# Patient Record
Sex: MALE | Race: WHITE | Employment: FULL TIME | URBAN - METROPOLITAN AREA
[De-identification: names, ages, dates, MRNs, and addresses within clinical notes are randomized per-mention and may not be internally consistent; named-entity substitution may affect disease eponyms.]

---

## 2018-08-27 ENCOUNTER — HOSPITAL ENCOUNTER (OUTPATIENT)
Dept: PHYSICAL THERAPY | Age: 43
Discharge: HOME | End: 2018-08-27
Payer: COMMERCIAL

## 2018-08-27 PROCEDURE — 97162 PT EVAL MOD COMPLEX 30 MIN: CPT
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
## 2018-08-27 NOTE — PROGRESS NOTES
Ambulatory/Rehab Services H2 Model Falls Risk Assessment    Risk Factor Pts. ·   Confusion/Disorientation/Impulsivity  []    4 ·   Symptomatic Depression  []   2 ·   Altered Elimination  []   1 ·   Dizziness/Vertigo  []   1 ·   Gender (Male)  [x]   1 ·   Any administered antiepileptics (anticonvulsants):  []   2 ·   Any administered benzodiazepines:  []   1 ·   Visual Impairment (specify):  []   1 ·   Portable Oxygen Use  []   1 ·   Orthostatic ? BP  []   1 ·   History of Recent Falls (within 3 mos.)  []   5     Ability to Rise from Chair (choose one) Pts. ·   Ability to rise in a single movement  []   0 ·   Pushes up, successful in one attempt  []   1 ·   Multiple attempts, but successful  []   3 ·   Unable to rise without assistance  []   4   Total: (5 or greater = High Risk) 1     Falls Prevention Plan:   []                Physical Limitations to Exercise (specify):   []                Mobility Assistance Device (type):   []                Exercise/Equipment Adaptation (specify):    ©2010 [facility] [address] Patent #[id number].  Federal Law prohibits the replication, distribution or use without written permission from [facility]

## 2018-09-06 ENCOUNTER — HOSPITAL ENCOUNTER (OUTPATIENT)
Dept: PHYSICAL THERAPY | Age: 43
Discharge: HOME | End: 2018-09-06
Payer: COMMERCIAL

## 2018-09-06 PROCEDURE — 97110 THERAPEUTIC EXERCISES: CPT

## 2018-09-06 PROCEDURE — 97140 MANUAL THERAPY 1/> REGIONS: CPT
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
## 2018-09-10 ENCOUNTER — HOSPITAL ENCOUNTER (OUTPATIENT)
Dept: PHYSICAL THERAPY | Age: 43
Discharge: HOME | End: 2018-09-10
Payer: COMMERCIAL

## 2018-09-10 PROCEDURE — 97140 MANUAL THERAPY 1/> REGIONS: CPT

## 2018-09-10 PROCEDURE — 97110 THERAPEUTIC EXERCISES: CPT
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
## 2018-09-17 ENCOUNTER — HOSPITAL ENCOUNTER (OUTPATIENT)
Dept: PHYSICAL THERAPY | Age: 43
Discharge: HOME | End: 2018-09-17
Payer: COMMERCIAL

## 2018-09-17 PROCEDURE — 97110 THERAPEUTIC EXERCISES: CPT

## 2018-09-17 PROCEDURE — 97140 MANUAL THERAPY 1/> REGIONS: CPT
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
## 2018-09-24 ENCOUNTER — HOSPITAL ENCOUNTER (OUTPATIENT)
Dept: PHYSICAL THERAPY | Age: 43
Discharge: HOME | End: 2018-09-24
Attending: UROLOGY
Payer: COMMERCIAL

## 2018-09-24 PROCEDURE — 97110 THERAPEUTIC EXERCISES: CPT

## 2018-09-24 PROCEDURE — 97140 MANUAL THERAPY 1/> REGIONS: CPT
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
## 2018-10-01 ENCOUNTER — HOSPITAL ENCOUNTER (OUTPATIENT)
Dept: PHYSICAL THERAPY | Age: 43
Discharge: HOME | End: 2018-10-01
Attending: UROLOGY
Payer: COMMERCIAL

## 2018-10-01 PROCEDURE — 97110 THERAPEUTIC EXERCISES: CPT

## 2018-10-01 PROCEDURE — 97140 MANUAL THERAPY 1/> REGIONS: CPT
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
## 2018-10-08 ENCOUNTER — HOSPITAL ENCOUNTER (OUTPATIENT)
Dept: PHYSICAL THERAPY | Age: 43
Discharge: HOME | End: 2018-10-08
Attending: UROLOGY
Payer: COMMERCIAL

## 2018-10-08 PROCEDURE — 97110 THERAPEUTIC EXERCISES: CPT

## 2018-10-08 PROCEDURE — 97140 MANUAL THERAPY 1/> REGIONS: CPT
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
## 2018-10-15 ENCOUNTER — HOSPITAL ENCOUNTER (OUTPATIENT)
Dept: PHYSICAL THERAPY | Age: 43
Discharge: HOME | End: 2018-10-15
Attending: UROLOGY
Payer: COMMERCIAL

## 2018-10-15 PROCEDURE — 97140 MANUAL THERAPY 1/> REGIONS: CPT

## 2018-10-15 PROCEDURE — 97110 THERAPEUTIC EXERCISES: CPT
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
## 2018-10-22 ENCOUNTER — HOSPITAL ENCOUNTER (OUTPATIENT)
Dept: PHYSICAL THERAPY | Age: 43
Discharge: HOME | End: 2018-10-22
Attending: UROLOGY
Payer: COMMERCIAL

## 2018-10-22 PROCEDURE — 97110 THERAPEUTIC EXERCISES: CPT

## 2018-10-22 PROCEDURE — 97140 MANUAL THERAPY 1/> REGIONS: CPT
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
## 2018-10-30 ENCOUNTER — HOSPITAL ENCOUNTER (OUTPATIENT)
Dept: PHYSICAL THERAPY | Age: 43
Discharge: HOME | End: 2018-10-30
Attending: UROLOGY
Payer: COMMERCIAL

## 2018-10-30 PROCEDURE — 97110 THERAPEUTIC EXERCISES: CPT

## 2018-10-30 PROCEDURE — 97140 MANUAL THERAPY 1/> REGIONS: CPT
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
## 2018-11-05 ENCOUNTER — HOSPITAL ENCOUNTER (OUTPATIENT)
Dept: PHYSICAL THERAPY | Age: 43
Discharge: HOME | End: 2018-11-05
Attending: UROLOGY
Payer: COMMERCIAL

## 2018-11-05 NOTE — PROGRESS NOTES
Reymundo Colon  : 1975  Primary: [facility]*  Secondary:  Therapy Center at [address] [place] [address]  [phone]   [phone]          OUTPATIENT DAILY NOTE    NAME/AGE/GENDER: Rosie Grossman is a 36 y.o. male. DATE: 2018    SUBJECTIVE:  Patient cancelled for appointment today due to travel out of town. Will plan to follow up on next scheduled visit.     Jen De La Torre PT,

## 2018-11-07 ENCOUNTER — APPOINTMENT (OUTPATIENT)
Dept: PHYSICAL THERAPY | Age: 43
End: 2018-11-07
Payer: COMMERCIAL

## 2018-11-12 ENCOUNTER — HOSPITAL ENCOUNTER (OUTPATIENT)
Dept: PHYSICAL THERAPY | Age: 43
Discharge: HOME | End: 2018-11-12
Attending: UROLOGY
Payer: COMMERCIAL

## 2018-11-12 PROCEDURE — 97110 THERAPEUTIC EXERCISES: CPT

## 2018-11-12 PROCEDURE — 97140 MANUAL THERAPY 1/> REGIONS: CPT
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
## 2018-11-19 ENCOUNTER — HOSPITAL ENCOUNTER (OUTPATIENT)
Dept: PHYSICAL THERAPY | Age: 43
Discharge: HOME | End: 2018-11-19
Attending: UROLOGY
Payer: COMMERCIAL

## 2018-11-19 PROBLEM — F41.8 ANXIETY ABOUT HEALTH: Status: ACTIVE | Noted: 2017-08-25

## 2018-11-19 PROBLEM — R10.2 PERINEAL PAIN IN MALE: Status: ACTIVE | Noted: 2017-09-08

## 2018-11-19 PROBLEM — N41.0 ACUTE PROSTATITIS: Status: ACTIVE | Noted: 2017-09-29

## 2018-11-19 PROCEDURE — 97110 THERAPEUTIC EXERCISES: CPT

## 2018-11-19 PROCEDURE — 97140 MANUAL THERAPY 1/> REGIONS: CPT
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
## 2018-11-28 ENCOUNTER — HOSPITAL ENCOUNTER (OUTPATIENT)
Dept: PHYSICAL THERAPY | Age: 43
Discharge: HOME | End: 2018-11-28
Payer: COMMERCIAL

## 2018-11-28 PROCEDURE — 97110 THERAPEUTIC EXERCISES: CPT

## 2018-11-28 PROCEDURE — 97140 MANUAL THERAPY 1/> REGIONS: CPT
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
## 2018-12-11 ENCOUNTER — HOSPITAL ENCOUNTER (OUTPATIENT)
Dept: PHYSICAL THERAPY | Age: 43
Discharge: HOME | End: 2018-12-11
Payer: COMMERCIAL

## 2018-12-11 PROCEDURE — 97110 THERAPEUTIC EXERCISES: CPT

## 2018-12-11 PROCEDURE — 97140 MANUAL THERAPY 1/> REGIONS: CPT
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
## 2018-12-18 ENCOUNTER — HOSPITAL ENCOUNTER (OUTPATIENT)
Dept: PHYSICAL THERAPY | Age: 43
Discharge: HOME | End: 2018-12-18
Payer: COMMERCIAL

## 2018-12-18 PROCEDURE — 97110 THERAPEUTIC EXERCISES: CPT

## 2018-12-18 PROCEDURE — 97140 MANUAL THERAPY 1/> REGIONS: CPT
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
## 2018-12-21 ENCOUNTER — APPOINTMENT (OUTPATIENT)
Dept: PHYSICAL THERAPY | Age: 43
End: 2018-12-21
Payer: COMMERCIAL

## 2018-12-26 ENCOUNTER — APPOINTMENT (OUTPATIENT)
Dept: PHYSICAL THERAPY | Age: 43
End: 2018-12-26
Payer: COMMERCIAL

## 2018-12-27 ENCOUNTER — HOSPITAL ENCOUNTER (OUTPATIENT)
Dept: PHYSICAL THERAPY | Age: 43
Discharge: HOME | End: 2018-12-27
Payer: COMMERCIAL

## 2018-12-27 PROCEDURE — 97110 THERAPEUTIC EXERCISES: CPT

## 2018-12-27 PROCEDURE — 97140 MANUAL THERAPY 1/> REGIONS: CPT
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
## 2019-01-09 ENCOUNTER — HOSPITAL ENCOUNTER (OUTPATIENT)
Dept: PHYSICAL THERAPY | Age: 44
Discharge: HOME | End: 2019-01-09
Payer: COMMERCIAL

## 2019-01-09 PROCEDURE — 97110 THERAPEUTIC EXERCISES: CPT
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
## 2019-01-16 ENCOUNTER — HOSPITAL ENCOUNTER (OUTPATIENT)
Dept: PHYSICAL THERAPY | Age: 44
Discharge: HOME | End: 2019-01-16
Payer: COMMERCIAL

## 2019-01-16 PROCEDURE — 97140 MANUAL THERAPY 1/> REGIONS: CPT

## 2019-01-16 PROCEDURE — 97110 THERAPEUTIC EXERCISES: CPT
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
## 2019-01-23 ENCOUNTER — HOSPITAL ENCOUNTER (OUTPATIENT)
Dept: PHYSICAL THERAPY | Age: 44
Discharge: HOME | End: 2019-01-23
Payer: COMMERCIAL

## 2019-01-23 PROCEDURE — 97110 THERAPEUTIC EXERCISES: CPT

## 2019-01-23 PROCEDURE — 97140 MANUAL THERAPY 1/> REGIONS: CPT
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
## 2019-01-30 ENCOUNTER — HOSPITAL ENCOUNTER (OUTPATIENT)
Dept: PHYSICAL THERAPY | Age: 44
Discharge: HOME | End: 2019-01-30
Payer: COMMERCIAL

## 2019-01-30 PROCEDURE — 97140 MANUAL THERAPY 1/> REGIONS: CPT

## 2019-01-30 PROCEDURE — 97110 THERAPEUTIC EXERCISES: CPT
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
## 2019-02-06 ENCOUNTER — HOSPITAL ENCOUNTER (OUTPATIENT)
Dept: PHYSICAL THERAPY | Age: 44
Discharge: HOME | End: 2019-02-06
Payer: SELF-PAY

## 2019-02-06 PROCEDURE — 97110 THERAPEUTIC EXERCISES: CPT

## 2019-02-06 PROCEDURE — 97140 MANUAL THERAPY 1/> REGIONS: CPT
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
## 2019-02-15 ENCOUNTER — HOSPITAL ENCOUNTER (OUTPATIENT)
Dept: PHYSICAL THERAPY | Age: 44
Discharge: HOME | End: 2019-02-15
Payer: SELF-PAY

## 2019-02-15 PROCEDURE — 97110 THERAPEUTIC EXERCISES: CPT

## 2019-02-15 PROCEDURE — 97140 MANUAL THERAPY 1/> REGIONS: CPT
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
## 2019-02-20 ENCOUNTER — HOSPITAL ENCOUNTER (OUTPATIENT)
Dept: PHYSICAL THERAPY | Age: 44
Discharge: HOME | End: 2019-02-20
Payer: SELF-PAY

## 2019-02-20 PROCEDURE — 97140 MANUAL THERAPY 1/> REGIONS: CPT

## 2019-02-20 PROCEDURE — 97110 THERAPEUTIC EXERCISES: CPT
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
## 2019-02-27 ENCOUNTER — HOSPITAL ENCOUNTER (OUTPATIENT)
Dept: PHYSICAL THERAPY | Age: 44
Discharge: HOME | End: 2019-02-27
Payer: SELF-PAY

## 2019-02-27 PROCEDURE — 97110 THERAPEUTIC EXERCISES: CPT

## 2019-02-27 PROCEDURE — 97140 MANUAL THERAPY 1/> REGIONS: CPT
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
## 2019-03-15 ENCOUNTER — HOSPITAL ENCOUNTER (OUTPATIENT)
Dept: PHYSICAL THERAPY | Age: 44
Discharge: HOME | End: 2019-03-15
Payer: SELF-PAY

## 2019-03-15 PROCEDURE — 97140 MANUAL THERAPY 1/> REGIONS: CPT

## 2019-03-15 PROCEDURE — 97110 THERAPEUTIC EXERCISES: CPT
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
## 2019-03-29 ENCOUNTER — HOSPITAL ENCOUNTER (OUTPATIENT)
Dept: PHYSICAL THERAPY | Age: 44
Discharge: HOME | End: 2019-03-29
Payer: SELF-PAY

## 2019-03-29 PROCEDURE — 97140 MANUAL THERAPY 1/> REGIONS: CPT

## 2019-03-29 PROCEDURE — 97110 THERAPEUTIC EXERCISES: CPT
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
## 2019-04-08 ENCOUNTER — HOSPITAL ENCOUNTER (OUTPATIENT)
Dept: PHYSICAL THERAPY | Age: 44
Discharge: HOME | End: 2019-04-08
Payer: SELF-PAY

## 2019-04-08 PROCEDURE — 97140 MANUAL THERAPY 1/> REGIONS: CPT

## 2019-04-08 PROCEDURE — 97110 THERAPEUTIC EXERCISES: CPT
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
## 2019-04-24 ENCOUNTER — HOSPITAL ENCOUNTER (OUTPATIENT)
Dept: PHYSICAL THERAPY | Age: 44
Discharge: HOME | End: 2019-04-24
Payer: SELF-PAY

## 2019-04-24 PROCEDURE — 97140 MANUAL THERAPY 1/> REGIONS: CPT

## 2019-04-24 PROCEDURE — 97110 THERAPEUTIC EXERCISES: CPT
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
## 2019-05-08 ENCOUNTER — HOSPITAL ENCOUNTER (OUTPATIENT)
Dept: PHYSICAL THERAPY | Age: 44
Discharge: HOME | End: 2019-05-08
Payer: SELF-PAY

## 2019-05-08 PROCEDURE — 97110 THERAPEUTIC EXERCISES: CPT

## 2019-05-08 PROCEDURE — 97140 MANUAL THERAPY 1/> REGIONS: CPT

## 2019-05-08 NOTE — PROGRESS NOTES
Gonzales Denney  : 1975  Payor: Delena Boeck / Plan: [place] OF [address] [place] [address] Rd / Product Type: PPO /  [id number]  at [facility]  [address] [place] [address]  [phone]   [phone]        OUTPATIENT PHYSICAL THERAPY: Daily Treatment Note 2019     Pre-treatment Symptoms/Complaints:  A little testicle pain at this time, but it continues to not even be on his mind for most days. Gold ball present. Mostly with sitting and following urination, but low grade. Trying to get in 3-4 walks and stretching per week. Pain: Initial: Pain Intensity 1: 1 /10 Post Session:  0/10   Medications Last Reviewed:  2019    Updated Objective Findings:  None Today   TREATMENT:     THERAPEUTIC EXERCISE: (35 minutes):  Exercises per grid below to improve mobility, strength and coordination. Required moderate verbal and tactile cues to promote proper performance of exercises. Progressed resistance, repetitions and complexity of movement as indicated. Date:  2019     Activity/Exercise Parameters   Hip Flexor Stretch B; 2 minutes   Adductor Stretch 2 minutes   Deep Squat Stretch 1 minute   Hip IR ROM Supine, Seated, 90/90 and prone; 5 minutes   Cat/Camel X 5 each direction   Thoracic Mobility On Foam Roll; 6 minutes   Happy Baby 3 x 30 seconds   Self PFM Release Crystal Wand; 15 minutes   Pelvic Floor Drops Paired w/ diaphragmatic breathing; 2 minutes   Patient Education    Home Exercise Program PFM Drops, Diaphragmatic Breathing, Deep Squat, Hip Flexor Stretch, Upward Dog, Cat/Camel; 1-2 x daily     MANUAL THERAPY: (18 minutes): Joint mobilization and Soft tissue mobilization was utilized and necessary because of the patient's restricted joint motion and restricted motion of soft tissue.   (Used abbreviations: MET - muscle energy technique; SCS- Strain counter strain; CTM- Connective tissue mobilizations; CR- Contract/relax; SP- Sustained pressure, TrP- Trigger point release, IASTM- Instrument assisted soft tissue mobilizations, TDN- Trigger point dry needling):  - SCS, CR, and SP throughout superficial PFM and pubococcygeus  - PA's throughout lumbosacral spine  - Passive R hip IR with sacral mobs    Treatment/Session Assessment:                  Response to Treatment:  Significant emphasis on first layer of PFM with reproduction of perineal pain at B perineum and ischiocavernosus. NO pain when leaving PT.    · Compliance with Program/Exercises: Compliant with basic drops, cardio and stretching at this time. · Recommendations/Intent for next treatment session: \"Next visit will focus on lumbar, thoracic and hip mobility\".     Treatment Plan of Care Effective Dates:  2/20/2019 to 5/15/2019  Total Treatment Billable Duration:  53 minutes  PT Patient Time In/Time Out  Time In: 2024  Time Out: [address] PT    Future Appointments   Date Time Provider Nico Singh   5/22/2019  1:00 PM [facility]   6/4/2019  1:00 PM Aiden Lugo PT [facility]

## 2019-05-22 ENCOUNTER — HOSPITAL ENCOUNTER (OUTPATIENT)
Dept: PHYSICAL THERAPY | Age: 44
End: 2019-05-22
Payer: SELF-PAY

## 2019-05-23 ENCOUNTER — HOSPITAL ENCOUNTER (OUTPATIENT)
Dept: PHYSICAL THERAPY | Age: 44
Discharge: HOME | End: 2019-05-23
Payer: SELF-PAY

## 2019-05-23 PROCEDURE — 97140 MANUAL THERAPY 1/> REGIONS: CPT

## 2019-05-23 PROCEDURE — 97110 THERAPEUTIC EXERCISES: CPT

## 2019-05-23 NOTE — PROGRESS NOTES
Bruno Rojo  : 1975  Payor: Simon Sifuentes / [place]: [place] [address] / Product Type: PPO /  [facility]  [address] [place] [address]  [phone]   [phone]        OUTPATIENT PHYSICAL THERAPY: Daily Treatment Note 2019     Pre-treatment Symptoms/Complaints:  See Re evaluation    Pain: Initial: Pain Intensity 1: 2  Pain Location 1: Pelvis /10 Post Session:  0/10   Medications Last Reviewed:  2019    Updated Objective Findings:  See evaluation note from today   TREATMENT:     THERAPEUTIC EXERCISE: (35 minutes):  Exercises per grid below to improve mobility, strength and coordination. Required moderate verbal and tactile cues to promote proper performance of exercises. Progressed resistance, repetitions and complexity of movement as indicated. Date:  2019     Activity/Exercise Parameters   Hip Flexor Stretch B; 2 minutes   Adductor Stretch 2 minutes   Deep Squat Stretch 1 minute   Hip IR ROM Supine, Seated, 90/90 and prone; 5 minutes   Cat/Camel X 5 each direction   Thoracic Mobility On Foam Roll; 6 minutes   Happy Baby 3 x 30 seconds   Self PFM Release Crystal Wand; 15 minutes   Pelvic Floor Drops Paired w/ diaphragmatic breathing; 2 minutes   Patient Education    Home Exercise Program PFM Drops, Diaphragmatic Breathing, Deep Squat, Hip Flexor Stretch, Upward Dog, Cat/Camel; 1-2 x daily     MANUAL THERAPY: (20 minutes): Joint mobilization and Soft tissue mobilization was utilized and necessary because of the patient's restricted joint motion and restricted motion of soft tissue.   (Used abbreviations: MET - muscle energy technique; SCS- Strain counter strain; CTM- Connective tissue mobilizations; CR- Contract/relax; SP- Sustained pressure, TrP- Trigger point release, IASTM- Instrument assisted soft tissue mobilizations, TDN- Trigger point dry needling):  - SCS, CR, and SP throughout superficial PFM and pubococcygeus  - PA's throughout lumbosacral spine  - Passive R hip IR with sacral mobs    Treatment/Session Assessment:                  Response to Treatment:  Pt continues to be a good candidate for PT. Will decrease frequency as symptoms improve. · Compliance with Program/Exercises: Compliant with basic drops, cardio and stretching at this time. · Recommendations/Intent for next treatment session: \"Next visit will focus on lumbar, thoracic and hip mobility\".     Treatment Plan of Care Effective Dates:  2/20/2019 to 5/15/2019  Total Treatment Billable Duration:  55 minutes  PT Patient Time In/Time Out  Time In: 0830  Time Out: 0930  Jaziel Barrow PT    Future Appointments   Date Time Provider Nico Singh   6/4/2019  1:00 PM Vonnie Larios PT [facility]
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
## 2019-05-31 ENCOUNTER — HOSPITAL ENCOUNTER (OUTPATIENT)
Dept: PHYSICAL THERAPY | Age: 44
End: 2019-05-31
Payer: SELF-PAY

## 2019-06-04 ENCOUNTER — HOSPITAL ENCOUNTER (OUTPATIENT)
Dept: PHYSICAL THERAPY | Age: 44
Discharge: HOME | End: 2019-06-04
Payer: SELF-PAY

## 2019-06-04 PROCEDURE — 97110 THERAPEUTIC EXERCISES: CPT

## 2019-06-04 PROCEDURE — 97140 MANUAL THERAPY 1/> REGIONS: CPT

## 2019-06-04 NOTE — PROGRESS NOTES
Gladys Mcclelland  : 1975  Payor: Yanick Sommer / [place] OF [address] [place] [address] Rd / Product Type: PPO /  [address]  at [facility][note 3]2 [address]  [phone]   [phone]        OUTPATIENT PHYSICAL THERAPY: Daily Treatment Note 2019     Pre-treatment Symptoms/Complaints:  Still in a good place, but relatively steady. Continued perineal pain in sitting. At worst 3/10. Testicular pain has come on ~ 2 times in the past week. Pain: Initial:   /10 Post Session:  010   Medications Last Reviewed:  2019    Updated Objective Findings:  None Today   TREATMENT:     THERAPEUTIC EXERCISE: (35 minutes):  Exercises per grid below to improve mobility, strength and coordination. Required moderate verbal and tactile cues to promote proper performance of exercises. Progressed resistance, repetitions and complexity of movement as indicated. Date:  2019     Activity/Exercise Parameters   Hip Flexor Stretch B; 2 minutes   Adductor Stretch 2 minutes   Deep Squat Stretch 1 minute   Hip IR ROM Supine, Seated, 90/90 and prone; 5 minutes   Cat/Camel X 5 each direction   Thoracic Mobility On Foam Roll; 6 minutes   Happy Baby 3 x 30 seconds   Self PFM Release Crystal Wand; 15 minutes   Pelvic Floor Drops Paired w/ diaphragmatic breathing; 2 minutes   Patient Education    Home Exercise Program PFM Drops, Diaphragmatic Breathing, Deep Squat, Hip Flexor Stretch, Upward Dog, Cat/Camel; 1-2 x daily     MANUAL THERAPY: (20 minutes): Joint mobilization and Soft tissue mobilization was utilized and necessary because of the patient's restricted joint motion and restricted motion of soft tissue.   (Used abbreviations: MET - muscle energy technique; SCS- Strain counter strain; CTM- Connective tissue mobilizations; CR- Contract/relax; SP- Sustained pressure, TrP- Trigger point release, IASTM- Instrument assisted soft tissue mobilizations, TDN- Trigger point dry needling):  - SCS, CR, and SP throughout superficial PFM and pubococcygeus  - PA's throughout lumbosacral spine  - Passive R hip IR with sacral mobs    Treatment/Session Assessment:                  Response to Treatment:  Emphasis on superficial urogenital triangle and R hip. Progressed hip IR ROM stretching this session as well to further improve pain in sitting. · Compliance with Program/Exercises: Compliant with basic drops, cardio and stretching at this time. · Recommendations/Intent for next treatment session: \"Next visit will focus on lumbar, thoracic and hip mobility\".     Treatment Plan of Care Effective Dates:  2/20/2019 to 5/15/2019  Total Treatment Billable Duration:  55 minutes     Brigitte Vasquez PT    Future Appointments   Date Time Provider Nico Singh   6/25/2019 12:00 PM Chrissy Montesinos PT [facility]

## 2019-06-25 ENCOUNTER — HOSPITAL ENCOUNTER (OUTPATIENT)
Dept: PHYSICAL THERAPY | Age: 44
Discharge: HOME | End: 2019-06-25
Payer: SELF-PAY

## 2019-06-25 PROCEDURE — 97140 MANUAL THERAPY 1/> REGIONS: CPT

## 2019-06-25 PROCEDURE — 97110 THERAPEUTIC EXERCISES: CPT

## 2019-06-25 NOTE — PROGRESS NOTES
Sarath Cabrera  : 1975  Payor: Kira Cline / [place] OF [address] [place] [address] Rd / Product Type: PPO /  [address]  at [facility]  [phone]   [phone]        OUTPATIENT PHYSICAL THERAPY: Daily Treatment Note 2019     Pre-treatment Symptoms/Complaints:  Still in a good place, but relatively steady. Continued perineal pain in sitting. At worst 3/10. However, there has been some daily testicular /hip pain that is nagging but then gradually subsides in the afternoon. Pain: Initial: Pain Intensity 1: 2 /10 Post Session:  0/10   Medications Last Reviewed:  2019    Updated Objective Findings:  None Today   TREATMENT:     THERAPEUTIC EXERCISE: (35 minutes):  Exercises per grid below to improve mobility, strength and coordination. Required moderate verbal and tactile cues to promote proper performance of exercises. Progressed resistance, repetitions and complexity of movement as indicated. Date:  2019     Activity/Exercise Parameters   Hip Flexor Stretch B; 2 minutes   Adductor Stretch 2 minutes   Deep Squat Stretch 1 minute   Hip IR ROM Supine, Seated, 90/90 and prone; 5 minutes   Cat/Camel X 5 each direction   Thoracic Mobility On Foam Roll; 6 minutes   Happy Baby 3 x 30 seconds   Self PFM Release Crystal Wand; 15 minutes   Pelvic Floor Drops Paired w/ diaphragmatic breathing; 2 minutes   Patient Education    Home Exercise Program PFM Drops, Diaphragmatic Breathing, Deep Squat, Hip Flexor Stretch, Upward Dog, Cat/Camel; 1-2 x daily     MANUAL THERAPY: (20 minutes): Joint mobilization and Soft tissue mobilization was utilized and necessary because of the patient's restricted joint motion and restricted motion of soft tissue.   (Used abbreviations: MET - muscle energy technique; SCS- Strain counter strain; CTM- Connective tissue mobilizations; CR- Contract/relax; SP- Sustained pressure, TrP- Trigger point release, IASTM- Instrument assisted soft tissue mobilizations, TDN- Trigger point dry needling):  - SCS, CR, and SP throughout superficial PFM and pubococcygeus  - PA's throughout lumbosacral spine  - Passive R hip IR with sacral mobs    Treatment/Session Assessment:                  Response to Treatment:  Tolerated treatment well. Progressing well    · Compliance with Program/Exercises: Compliant with basic drops, cardio and stretching at this time. · Recommendations/Intent for next treatment session: \"Next visit will focus on lumbar, thoracic and hip mobility\".     Treatment Plan of Care Effective Dates:  2/20/2019 to 5/15/2019  Total Treatment Billable Duration:  55 minutes  PT Patient Time In/Time Out  Time In: 1200  Time Out: [address], PT    Future Appointments   Date Time Provider Nico Singh   7/18/2019 12:00 PM Stalni Farah, PT [facility]

## 2019-07-18 ENCOUNTER — HOSPITAL ENCOUNTER (OUTPATIENT)
Dept: PHYSICAL THERAPY | Age: 44
Discharge: HOME | End: 2019-07-18
Payer: SELF-PAY

## 2019-07-18 PROCEDURE — 97110 THERAPEUTIC EXERCISES: CPT

## 2019-07-18 PROCEDURE — 97140 MANUAL THERAPY 1/> REGIONS: CPT

## 2019-07-18 NOTE — PROGRESS NOTES
Alida Pierre  : 1975  Payor: Kelley Yarbrough / [place] OF [address] [place] [address] Rd / Product Type: PPO /  [address]  at [facility]2 [address]  [phone]   [phone]        OUTPATIENT PHYSICAL THERAPY: Daily Treatment Note 2019     Pre-treatment Symptoms/Complaints:  Still in a good place, but relatively steady. Cyclical nature in pain. Mostly low grade hip/testicular pain in the day and then better in the evening. Pain: Initial: Pain Intensity 1: 1 /10 Post Session:  0/10   Medications Last Reviewed:  2019    Updated Objective Findings:  None Today   TREATMENT:     THERAPEUTIC EXERCISE: (35 minutes):  Exercises per grid below to improve mobility, strength and coordination. Required moderate verbal and tactile cues to promote proper performance of exercises. Progressed resistance, repetitions and complexity of movement as indicated. Date:  2019     Activity/Exercise Parameters   Hip Flexor Stretch B; 2 minutes   Adductor Stretch 2 minutes   Deep Squat Stretch 1 minute   Hip IR ROM Supine, Seated, 90/90 and prone; 5 minutes   Cat/Camel X 5 each direction   Thoracic Mobility On Foam Roll; 6 minutes   Happy Baby 3 x 30 seconds   Self PFM Release Crystal Wand; 15 minutes   Pelvic Floor Drops Paired w/ diaphragmatic breathing; 2 minutes   Patient Education    Home Exercise Program PFM Drops, Diaphragmatic Breathing, Deep Squat, Hip Flexor Stretch, Upward Dog, Cat/Camel; 1-2 x daily     MANUAL THERAPY: (20 minutes): Joint mobilization and Soft tissue mobilization was utilized and necessary because of the patient's restricted joint motion and restricted motion of soft tissue.   (Used abbreviations: MET - muscle energy technique; SCS- Strain counter strain; CTM- Connective tissue mobilizations; CR- Contract/relax; SP- Sustained pressure, TrP- Trigger point release, IASTM- Instrument assisted soft tissue mobilizations, TDN- Trigger point dry needling):  - SCS, CR, and SP throughout superficial PFM and pubococcygeus  - PA's throughout lumbosacral spine  - Passive R hip IR with sacral mobs  - CTM throughout R LQ and iliacus    Treatment/Session Assessment:                  Response to Treatment:  Continues to tolerate treatment well. Progressing well    · Compliance with Program/Exercises: Compliant with basic drops, cardio and stretching at this time. · Recommendations/Intent for next treatment session: \"Next visit will focus on lumbar, thoracic and hip mobility\".     Treatment Plan of Care Effective Dates:  2/20/2019 to 5/15/2019  Total Treatment Billable Duration:  55 minutes  PT Patient Time In/Time Out  Time In: 1200  Time Out: [address], PT    Future Appointments   Date Time Provider Nico Singh   8/5/2019  1:00 PM Sahrmin Robison, PT [facility]

## 2019-08-09 ENCOUNTER — HOSPITAL ENCOUNTER (OUTPATIENT)
Dept: PHYSICAL THERAPY | Age: 44
Discharge: HOME | End: 2019-08-09
Payer: SELF-PAY

## 2019-08-09 PROCEDURE — 97140 MANUAL THERAPY 1/> REGIONS: CPT

## 2019-08-09 PROCEDURE — 97110 THERAPEUTIC EXERCISES: CPT

## 2019-08-09 NOTE — PROGRESS NOTES
Alvaro Huddleston  : 1975  Payor: SELF PAY / Plan: BSI ELECT SELF PAY / Product Type: Self Pay /  [address]  at [facility]  [address] [place] [address]  [phone]   [phone]        OUTPATIENT PHYSICAL THERAPY: Daily Treatment Note 2019     Pre-treatment Symptoms/Complaints: Continue to be in a relatively good place. Pain no more than a 1-2/10 in the testicle, but is present on a daily basis. \"the pees\" still haven't returned. Pain: Initial: Pain Intensity 1: 1 /10 Post Session:  0/10   Medications Last Reviewed:  2019    Updated Objective Findings:  None Today   TREATMENT:     THERAPEUTIC EXERCISE: (35 minutes):  Exercises per grid below to improve mobility, strength and coordination. Required moderate verbal and tactile cues to promote proper performance of exercises. Progressed resistance, repetitions and complexity of movement as indicated. Date:  2019     Activity/Exercise Parameters   Hip Flexor Stretch B; 2 minutes   Adductor Stretch 2 minutes   Deep Squat Stretch 1 minute   Hip IR ROM Supine, Seated, 90/90 and prone; 5 minutes   Cat/Camel X 5 each direction   Thoracic Mobility    Happy Baby 3 x 30 seconds   Self PFM Release Crystal Wand; 15 minutes   Pelvic Floor Drops Paired w/ diaphragmatic breathing; 2 minutes   Patient Education Self Release w Loman Jaqueline ball; 6 minutes   Home Exercise Program PFM Drops, Diaphragmatic Breathing, Deep Squat, Hip Flexor Stretch, Upward Dog, Cat/Camel; 1-2 x daily     MANUAL THERAPY: (20 minutes): Joint mobilization and Soft tissue mobilization was utilized and necessary because of the patient's restricted joint motion and restricted motion of soft tissue.   (Used abbreviations: MET - muscle energy technique; SCS- Strain counter strain; CTM- Connective tissue mobilizations; CR- Contract/relax; SP- Sustained pressure, TrP- Trigger point release, IASTM- Instrument assisted soft tissue mobilizations, TDN- Trigger point dry needling):  - SCS, CR, and SP throughout superficial PFM and pubococcygeus  - PA's throughout lumbosacral spine  - Passive R hip IR with sacral mobs  - CTM throughout R LQ and iliacus    Treatment/Session Assessment:                  Response to Treatment:  Continues to tolerate treatment well. Progressing well    · Compliance with Program/Exercises: Compliant with basic drops, cardio and stretching at this time. · Recommendations/Intent for next treatment session: \"Next visit will focus on lumbar, thoracic and hip mobility\".     Treatment Plan of Care Effective Dates:  2/20/2019 to 5/15/2019  Total Treatment Billable Duration:  55 minutes  PT Patient Time In/Time Out  Time In: 0930  Time Out: [address], PT    Future Appointments   Date Time Provider Nico Singh   8/28/2019  9:30 AM Chay Hatch, PT [facility]

## 2019-08-28 ENCOUNTER — HOSPITAL ENCOUNTER (OUTPATIENT)
Dept: PHYSICAL THERAPY | Age: 44
Discharge: HOME | End: 2019-08-28
Payer: SELF-PAY

## 2019-08-28 PROCEDURE — 97110 THERAPEUTIC EXERCISES: CPT

## 2019-08-28 NOTE — PROGRESS NOTES
Antonio Cerda  : 1975  Payor: SELF PAY / Plan: BSI ELECT SELF PAY / Product Type: Self Pay /  [address]  at [facility]  [address] [place] [address]  [phone]   [phone]        OUTPATIENT PHYSICAL THERAPY: Daily Treatment Note 2019     Pre-treatment Symptoms/Complaints: Been a little more hip pain than usual. Perineum, fine and no urinary complaints. Pain: Initial: Pain Intensity 1: 2 /10 Post Session:  0/10   Medications Last Reviewed:  2019    Updated Objective Findings:  None Today   TREATMENT:     THERAPEUTIC EXERCISE: (45 minutes):  Exercises per grid below to improve mobility, strength and coordination. Required moderate verbal and tactile cues to promote proper performance of exercises. Progressed resistance, repetitions and complexity of movement as indicated. Date:  2019     Activity/Exercise Parameters   Hip Flexor Stretch B; 2 minutes   Adductor Stretch 2 minutes   Deep Squat Stretch 1 minute   Hip IR ROM Supine, Seated, 90/90 and prone; 5 minutes   Cat/Camel X 5 each direction   Hip Mobility 5 minutes B   Happy Baby 3 x 30 seconds   Self PFM Release Crystal Wand; 20 minutes   Pelvic Floor Drops Paired w/ diaphragmatic breathing; 2 minutes   Patient Education    Home Exercise Program PFM Drops, Diaphragmatic Breathing, Deep Squat, Hip Flexor Stretch, Upward Dog, Cat/Camel; 1-2 x daily     MANUAL THERAPY: (5 minutes): Joint mobilization and Soft tissue mobilization was utilized and necessary because of the patient's restricted joint motion and restricted motion of soft tissue.   (Used abbreviations: MET - muscle energy technique; SCS- Strain counter strain; CTM- Connective tissue mobilizations; CR- Contract/relax; SP- Sustained pressure, TrP- Trigger point release, IASTM- Instrument assisted soft tissue mobilizations, TDN- Trigger point dry needling):  - SCS, CR, and SP throughout superficial PFM and pubococcygeus  - PA's throughout lumbosacral spine- NOT PERFORMED  - Passive R hip IR with sacral mobs- NOT PERFORMED  - CTM throughout R LQ and iliacus- NOT PERFORMED    Treatment/Session Assessment:                  Response to Treatment:  Emphasis on hip mobility. Prone passive hip IR and extension reproducing groin pain. Will benefit form continued hip exercise and MT    · Compliance with Program/Exercises: Compliant with basic drops, cardio and stretching at this time. · Recommendations/Intent for next treatment session: \"Next visit will focus on hip mobility\".     Treatment Plan of Care Effective Dates:  2/20/2019 to 5/15/2019  Total Treatment Billable Duration:  50 minutes  PT Patient Time In/Time Out  Time In: 0930  Time Out: [address] [facility], PT    Future Appointments   Date Time Provider Nico Singh   9/17/2019 12:00 PM Krystle Cedeno, PT [facility]
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
## 2019-09-17 ENCOUNTER — HOSPITAL ENCOUNTER (OUTPATIENT)
Dept: PHYSICAL THERAPY | Age: 44
Discharge: HOME | End: 2019-09-17
Payer: SELF-PAY

## 2019-09-17 PROCEDURE — 97110 THERAPEUTIC EXERCISES: CPT

## 2019-09-17 NOTE — PROGRESS NOTES
Kimani Lisbeth  : 1975  Payor: SELF PAY / Plan: BSHSI ELECT SELF PAY / Product Type: Self Pay /  [address]  at [facility]  [address] [place] [address]  [phone]   [phone]        OUTPATIENT PHYSICAL THERAPY: Daily Treatment Note 2019     Pre-treatment Symptoms/Complaints: Strung together a few good days. No pees. Pain: Initial: Pain Intensity 1: 2 /10 Post Session:  0/10   Medications Last Reviewed:  2019    Updated Objective Findings:  None Today   TREATMENT:     THERAPEUTIC EXERCISE: (45 minutes):  Exercises per grid below to improve mobility, strength and coordination. Required moderate verbal and tactile cues to promote proper performance of exercises. Progressed resistance, repetitions and complexity of movement as indicated. Date:  2019     Activity/Exercise Parameters   Hip Flexor Stretch B; 2 minutes   Adductor Stretch 2 minutes   Deep Squat Stretch    Hip IR ROM    Cat/Camel X 5 each direction   Hip Mobility    Happy Baby 3 x 30 seconds   Self PFM Release Crystal Wand; 10 minutes   Pelvic Floor Drops Paired w/ diaphragmatic breathing; 2 minutes   Inguinal ligament mobilizations  Passive and Active; 20 minutes   Home Exercise Program PFM Drops, Diaphragmatic Breathing, Deep Squat, Hip Flexor Stretch, Upward Dog, Cat/Camel; 1-2 x daily     MANUAL THERAPY: (7 minutes): Joint mobilization and Soft tissue mobilization was utilized and necessary because of the patient's restricted joint motion and restricted motion of soft tissue.   (Used abbreviations: MET - muscle energy technique; SCS- Strain counter strain; CTM- Connective tissue mobilizations; CR- Contract/relax; SP- Sustained pressure, TrP- Trigger point release, IASTM- Instrument assisted soft tissue mobilizations, TDN- Trigger point dry needling):  - SCS, CR, and SP throughout superficial PFM and pubococcygeus  - Perineal Release with Opposite hand on tailbone  - PA's throughout lumbosacral spine- NOT PERFORMED                Response to Treatment:  Emphasis on inguinal ligament mobilizations and complete reproduction of groin pain. Will continue to perform this to see if this minimizes symptoms even more. · Compliance with Program/Exercises: Compliant with basic drops, cardio and stretching at this time. · Recommendations/Intent for next treatment session: \"Next visit will focus on hip mobility\".     Treatment Plan of Care Effective Dates:  2/20/2019 to 5/15/2019  Total Treatment Billable Duration:  52 minutes  PT Patient Time In/Time Out  Time In: 1205  Time Out: [address], PT    Future Appointments   Date Time Provider Nico Singh   10/7/2019 12:00 PM Joann Dill, PT [facility]

## 2019-10-07 ENCOUNTER — HOSPITAL ENCOUNTER (OUTPATIENT)
Dept: PHYSICAL THERAPY | Age: 44
Discharge: HOME | End: 2019-10-07
Payer: SELF-PAY

## 2019-10-07 PROCEDURE — 97140 MANUAL THERAPY 1/> REGIONS: CPT

## 2019-10-07 PROCEDURE — 97110 THERAPEUTIC EXERCISES: CPT

## 2019-10-07 NOTE — PROGRESS NOTES
Kika Arshad  : 1975  Payor: SELF PAY / Plan: BSI ELECT SELF PAY / Product Type: Self Pay /  [address]  at [facility]  [address] [place] [address]  [phone]   [phone]        OUTPATIENT PHYSICAL THERAPY: Daily Treatment Note 10/7/2019     Pre-treatment Symptoms/Complaints: Has actually been feeling really good. Lately. Very low grade pain if any in the testicle/groin, until today. Pain: Initial: Pain Intensity 1: 1 10 Post Session:  0/10   Medications Last Reviewed:  10/7/2019    Updated Objective Findings:  None Today   TREATMENT:     THERAPEUTIC EXERCISE: (40 minutes):  Exercises per grid below to improve mobility, strength and coordination. Required moderate verbal and tactile cues to promote proper performance of exercises. Progressed resistance, repetitions and complexity of movement as indicated. Date:  2019     Activity/Exercise Parameters   Hip Flexor Stretch B; 2 minutes   Adductor Stretch 2 minutes   Deep Squat Stretch    Hip IR ROM    Cat/Camel X 5 each direction   Hip Mobility    Happy Baby 3 x 30 seconds   Self PFM Release Crystal Wand; 10 minutes   Pelvic Floor Drops Paired w/ diaphragmatic breathing; 2 minutes   Inguinal ligament mobilizations  Passive and Active; 20 minutes   Home Exercise Program PFM Drops, Diaphragmatic Breathing, Deep Squat, Hip Flexor Stretch, Upward Dog, Cat/Camel; 1-2 x daily     MANUAL THERAPY: (10 minutes): Joint mobilization and Soft tissue mobilization was utilized and necessary because of the patient's restricted joint motion and restricted motion of soft tissue.   (Used abbreviations: MET - muscle energy technique; SCS- Strain counter strain; CTM- Connective tissue mobilizations; CR- Contract/relax; SP- Sustained pressure, TrP- Trigger point release, IASTM- Instrument assisted soft tissue mobilizations, TDN- Trigger point dry needling):  - SCS, CR, and SP throughout superficial PFM and pubococcygeus  - Perineal Release with Opposite hand on tailbone- NOT PERFORMED  - Inguinal canal mobilizations; all directions                Response to Treatment:  Emphasis on inguinal ligament mobilizations and complete reproduction of groin pain. Will continue to perform this to see if this minimizes symptoms even more. · Compliance with Program/Exercises: Compliant with basic drops, cardio and stretching at this time. · Recommendations/Intent for next treatment session: \"Next visit will focus on hip mobility\".     Treatment Plan of Care Effective Dates:  2/20/2019 to 5/15/2019  Total Treatment Billable Duration:  50 minutes  PT Patient Time In/Time Out  Time In: 1200  Time Out: Naun 1690, PT    Future Appointments   Date Time Provider Nico Singh   10/24/2019 11:00 AM Rupinder Hatch, PT [facility]

## 2019-10-24 ENCOUNTER — HOSPITAL ENCOUNTER (OUTPATIENT)
Dept: PHYSICAL THERAPY | Age: 44
Discharge: HOME | End: 2019-10-24
Payer: SELF-PAY

## 2019-10-24 PROCEDURE — 97110 THERAPEUTIC EXERCISES: CPT

## 2019-10-24 PROCEDURE — 97140 MANUAL THERAPY 1/> REGIONS: CPT

## 2019-10-24 NOTE — PROGRESS NOTES
Shon Jones  : 1975  Payor: SELF PAY / Plan: BSI ELECT SELF PAY / Product Type: Self Pay /  [address]  at [facility]  [address] [place] [address]  [phone]   [phone]        OUTPATIENT PHYSICAL THERAPY: Daily Treatment Note 10/24/2019     Pre-treatment Symptoms/Complaints: It has been a very rough week. Increased perineal pain. And feels like the pees are coming back. Pain: Initial: Pain Intensity 1: 3 /10 Post Session:  2/10   Medications Last Reviewed:  10/24/2019    Updated Objective Findings:  None Today   TREATMENT:     THERAPEUTIC EXERCISE: (40 minutes):  Exercises per grid below to improve mobility, strength and coordination. Required moderate verbal and tactile cues to promote proper performance of exercises. Progressed resistance, repetitions and complexity of movement as indicated. Date:  2019     Activity/Exercise Parameters   Hip Flexor Stretch B; 2 minutes   Adductor Stretch 2 minutes   Education Bladder Health   Hip IR ROM    Cat/Camel X 5 each direction   Hip Mobility    Happy Baby 3 x 30 seconds   Self PFM Release Crystal Wand; 10 minutes   Pelvic Floor Drops Paired w/ diaphragmatic breathing; 2 minutes   Inguinal ligament mobilizations  Passive and Active; 20 minutes   Home Exercise Program PFM Drops, Diaphragmatic Breathing, Deep Squat, Hip Flexor Stretch, Upward Dog, Cat/Camel; 1-2 x daily     MANUAL THERAPY: (10 minutes): Joint mobilization and Soft tissue mobilization was utilized and necessary because of the patient's restricted joint motion and restricted motion of soft tissue.   (Used abbreviations: MET - muscle energy technique; SCS- Strain counter strain; CTM- Connective tissue mobilizations; CR- Contract/relax; SP- Sustained pressure, TrP- Trigger point release, IASTM- Instrument assisted soft tissue mobilizations, TDN- Trigger point dry needling):  - SCS, CR, and SP throughout superficial PFM and pubococcygeus  - Perineal Release with Opposite hand on tailbone- NOT PERFORMED  - Inguinal canal mobilizations; all directions                Response to Treatment:  Emphasis on perineal pain. Improvements in mobility following PT. Discussed bladder health    · Compliance with Program/Exercises: Compliant with basic drops, cardio and stretching at this time. · Recommendations/Intent for next treatment session: \"Next visit will focus on hip mobility\".     Treatment Plan of Care Effective Dates:  2/20/2019 to 5/15/2019  Total Treatment Billable Duration:  50 minutes  PT Patient Time In/Time Out  Time In: 1100  Time Out: Olegario Reyes PT    Future Appointments   Date Time Provider Nico Singh   11/14/2019  9:00 AM Clare ACEVEDO PT [facility]   12/5/2019 12:00 PM Manoj Mckeon, SANDER [facility]

## 2019-10-25 ENCOUNTER — APPOINTMENT (OUTPATIENT)
Dept: PHYSICAL THERAPY | Age: 44
End: 2019-10-25
Payer: SELF-PAY

## 2019-11-14 ENCOUNTER — HOSPITAL ENCOUNTER (OUTPATIENT)
Dept: PHYSICAL THERAPY | Age: 44
Discharge: HOME | End: 2019-11-14
Payer: SELF-PAY

## 2019-11-14 PROCEDURE — 97110 THERAPEUTIC EXERCISES: CPT

## 2019-11-14 PROCEDURE — 97140 MANUAL THERAPY 1/> REGIONS: CPT

## 2019-11-14 NOTE — PROGRESS NOTES
Geroldine Romberg  : 1975  Payor: SELF PAY / Plan: BSHSI ELECT SELF PAY / Product Type: Self Pay /  [address]  at [facility]  [address] [place] [address]  [phone]   [phone]        OUTPATIENT PHYSICAL THERAPY: Daily Treatment Note 2019     Pre-treatment Symptoms/Complaints: Been a better two weeks. Working on bladder health. Pees come in the evening. But short lived. Then minimal testicular pain at this time. Pain: Initial: Pain Intensity 1: 2 /10 Post Session:  2/10   Medications Last Reviewed:  2019    Updated Objective Findings:  None Today   TREATMENT:     THERAPEUTIC EXERCISE: (40 minutes):  Exercises per grid below to improve mobility, strength and coordination. Required moderate verbal and tactile cues to promote proper performance of exercises. Progressed resistance, repetitions and complexity of movement as indicated. Date:  2019     Activity/Exercise Parameters   Hip Flexor Stretch B; 2 minutes   Adductor Stretch 2 minutes   Education Bladder Health   Hip IR ROM    Cat/Camel X 5 each direction   Hip Mobility    Happy Baby 3 x 30 seconds   Self PFM Release Crystal Wand; 10 minutes   Pelvic Floor Drops Paired w/ diaphragmatic breathing; 2 minutes   Inguinal ligament mobilizations  Passive and Active; 20 minutes   Home Exercise Program PFM Drops, Diaphragmatic Breathing, Deep Squat, Hip Flexor Stretch, Upward Dog, Cat/Camel; 1-2 x daily     MANUAL THERAPY: (10 minutes): Joint mobilization and Soft tissue mobilization was utilized and necessary because of the patient's restricted joint motion and restricted motion of soft tissue.   (Used abbreviations: MET - muscle energy technique; SCS- Strain counter strain; CTM- Connective tissue mobilizations; CR- Contract/relax; SP- Sustained pressure, TrP- Trigger point release, IASTM- Instrument assisted soft tissue mobilizations, TDN- Trigger point dry needling):  - SCS, CR, and SP throughout superficial PFM and pubococcygeus  - Perineal Release with Opposite hand on tailbone- NOT PERFORMED  - Inguinal canal mobilizations; all directions                Response to Treatment:  Tolerated treatment well. Will plan to DC in the next few sessions. · Compliance with Program/Exercises: Compliant with basic drops, cardio and stretching at this time. · Recommendations/Intent for next treatment session: \"Next visit will focus on hip mobility\". Total Treatment Billable Duration:  50 minutes  PT Patient Time In/Time Out  Time In: 0900  Time Out: 0950  Becka Colbert, PT    No future appointments.

## 2019-12-04 NOTE — THERAPY DISCHARGE
Johnathon Mueller : 1975 Payor: SELF PAY / Plan: BSI ELECT SELF PAY / Product Type: Self Pay /  [address]  at [facility] 
[address] [place] [address] [phone]   [phone] OUTPATIENT PHYSICAL THERAPY:Discontinuation Summary 10/24/2019 ICD-10: Treatment Diagnosis: pelvic pain (R10.20); lack of coordination (R27.8); generalized weakness (M62.81) Precautions/Allergies:  
Patient has no known allergies. Fall Risk Score: 1 (? 5 = High Risk) MD Orders: Evaluate and treat MEDICAL/REFERRING DIAGNOSIS: 
Pelvic and perineal pain [R10.2] DATE OF ONSET: 2017 REFERRING PHYSICIAN: Elfego Roche* RETURN PHYSICIAN APPOINTMENT: TBD Discontinuations: Johnathon Mueller has been seen in physical therapy for 38 visits. Treatment has been discontinued at this time due to no longer in need of PT. The below goals were met prior to discontinuation. Some goals were not met due to early DC. Thank you for this referral.    
 
Re certification :  Due to continued pain, though manageable with PT we would like to extend PT at this time. RE-CERTIFICATION/RE-EVALUATION 2019: Anton Jamil has been seen for 6 sessions since last re-evaluation. He continues to make slow and steady progress towards painfree ADL's and recreation. His testicular pain varies on a daily basis but generally has not been more than a 2/10 on most days, and perineal pain is present constantly in sitting, though low grade. He will benefit from continued PT to further improve his mobility and decrease pain. RE CERTIFICATION/RE-EVALUATION 2019: Mr. Lin Kennedy has been seen in Pelvic PT for a total of 23 visits (10 since re-evaluation on ) with very slow and steady improvements in his pelvic pain symptoms.  Since January, he has begun walking and stretching 4 days weekly, as well as with continued PT, and he is finally reporting prolonged bouts of time where he does not even notice testicular pain. Perineal pain and pressure is still present on a daily basis in sitting, but is generally < 2/10. His urinary frequency and incomplete emptying is also improved, but he is very fearful this will return so he continues to limit his fluid intake and minimize his voids during the day. He will benefit from continued PT with an emphasis on down training, continued manual therapy (MT), flexibility, mobility, and hip ROM to further improve his symptoms and return to his prior level of function (PLOF). RE CERTIFICATION/RE-EVALUATION 11/28/2018: Mr. Mary Tavares has been seen for 13 sessions with improvements in his tolerance for internal and external manual therapy, improved PFM ROM and voluntary relaxation, and is compliant with a basic PFM/stretching HEP. He demonstrates temporary gains in pain levels, but continues to report pelvic pain on a daily basis. However the severity of pain is improved on a daily basis. He is reporting no urinary complaints at the time, however, and the severity of his pain is less. We discussed adding rectal valium prior to therapy to continue to improve PFM ROM and will continue aggressive MT and mobility to decrease, even eliminate his pelvic pain. He is pleasant and motivated to improve. INITIAL ASSESSMENT:  Mr. Mary Tavares presents with an overactive, tight, and tender pelvic floor muscle (PFM) group that is reproducing the pain he feels in his genitals and perineum on a daily basis. He also demonstrates a lack of coordination in the PFM, further contributing to tightness and pain with sitting and other ADL's. This pain limits him from recreating and is interfering with his work. Lastly, he demonstrates connective tissue (CT) restrictions and muscle tightness throughout the exterior pelvic girdle which is compounding his pelvic pain.  I believe he will benefit form skilled PT with an emphasis on manual therapy, down training, gentle mobility with gradual gluteal strengthening, and general bowel, bladder, and sexual health to return to his prior level of function (PLOF). He is pleasnt and extremely motivated. PROBLEM LIST (Impacting functional limitations): 1. Decreased Strength 2. Decreased ADL/Functional Activities 3. Increased Pain 4. Decreased Activity Tolerance 5. Decreased Flexibility/Joint Mobility 6. Decreased [place] with Home Exercise Program INTERVENTIONS PLANNED: 
1. Electrical Stimulation 2. Heat 3. Home Exercise Program (HEP) 4. Manual Therapy 5. Therapeutic Exercise/Strengthening TREATMENT PLAN: 
Effective Dates: 8/26/2019 TO 11/20/2019. Frequency/Duration: Every two-three weeks GOALS: (Goals have been discussed and agreed upon with patient.) Short-Term Functional Goals: Time Frame: 4 weeks 1. Patient will verbalize rationale and purposes for exercises. (MET 10/15/2018) 2. Pt will demonstrate 10 quick flicks of the pelvic floor muscle group, without compensation, to implement urge suppression appropriately with urgency of urination and decrease the sensation of \"pees\" during the day. (MET 2/20/2019) Discharge Goals: Time Frame: 12 weeks 1. Pt with demonstrate normal voluntary relaxation of the pelvic floor muscle group to improve pelvic floor ROM and tolerate pain free sitting x 30 minutes. (UNKNOWN) 2. Pt will demonstrate B hip IR ROM WNL and painfree to improve mobility and decrease tightness in the PFM group, to tolerate standing without testicular pain x 60 minutes. (MET SUBJECTIVE PORTION, HOWEVER HIP ROM STILL LIMITED) 3. Pt will demonstrate appropriate co contraction of the Transversus Abdominus and Pelvic Floor muscle group, and maintain x 60 seconds to improve core stability and return to dynamic physical activity such as running and jumping.(UNKNOWN) Rehabilitation Potential For Stated Goals: Good

## 2019-12-05 ENCOUNTER — APPOINTMENT (OUTPATIENT)
Dept: PHYSICAL THERAPY | Age: 44
End: 2019-12-05

## 2022-03-18 PROBLEM — N41.0 ACUTE PROSTATITIS: Status: ACTIVE | Noted: 2017-09-29

## 2022-03-19 PROBLEM — R45.89 ANXIETY ABOUT HEALTH: Status: ACTIVE | Noted: 2017-08-25

## 2022-03-19 PROBLEM — R10.2 PERINEAL PAIN IN MALE: Status: ACTIVE | Noted: 2017-09-08

## 2024-09-26 ENCOUNTER — OFFICE VISIT (OUTPATIENT)
Age: 49
End: 2024-09-26

## 2024-09-26 VITALS
DIASTOLIC BLOOD PRESSURE: 96 MMHG | HEART RATE: 92 BPM | OXYGEN SATURATION: 99 % | RESPIRATION RATE: 16 BRPM | SYSTOLIC BLOOD PRESSURE: 124 MMHG

## 2024-09-26 DIAGNOSIS — M25.571 ACUTE RIGHT ANKLE PAIN: Primary | ICD-10-CM

## 2024-09-26 PROBLEM — N41.0 ACUTE PROSTATITIS: Status: ACTIVE | Noted: 2017-09-29

## 2024-09-26 PROBLEM — R10.2 PERINEAL PAIN IN MALE: Status: ACTIVE | Noted: 2017-09-08

## 2024-09-26 PROBLEM — S39.94XA PENILE TRAUMA: Status: ACTIVE | Noted: 2017-08-25

## 2024-09-26 PROBLEM — E78.2 MIXED HYPERLIPIDEMIA: Status: ACTIVE | Noted: 2021-07-29

## 2024-09-26 PROBLEM — R00.2 PALPITATIONS: Status: ACTIVE | Noted: 2021-07-29

## 2024-09-26 PROBLEM — I51.7 LEFT VENTRICULAR HYPERTROPHY: Status: ACTIVE | Noted: 2021-07-29

## 2024-09-26 ASSESSMENT — ENCOUNTER SYMPTOMS
SHORTNESS OF BREATH: 0
ABDOMINAL PAIN: 0
BACK PAIN: 0

## 2024-10-01 ENCOUNTER — OFFICE VISIT (OUTPATIENT)
Dept: ORTHOPEDIC SURGERY | Age: 49
End: 2024-10-01
Payer: COMMERCIAL

## 2024-10-01 DIAGNOSIS — S99.911A INJURY OF RIGHT ANKLE, INITIAL ENCOUNTER: Primary | ICD-10-CM

## 2024-10-01 PROCEDURE — 99204 OFFICE O/P NEW MOD 45 MIN: CPT | Performed by: ORTHOPAEDIC SURGERY

## 2024-10-01 PROCEDURE — L4361 PNEUMA/VAC WALK BOOT PRE OTS: HCPCS | Performed by: ORTHOPAEDIC SURGERY

## 2024-10-01 RX ORDER — ASPIRIN 81 MG/1
81 TABLET ORAL 2 TIMES DAILY
Qty: 42 TABLET | Refills: 0 | Status: SHIPPED | OUTPATIENT
Start: 2024-10-01 | End: 2024-10-22

## 2024-10-01 RX ORDER — ERGOCALCIFEROL 1.25 MG/1
50000 CAPSULE, LIQUID FILLED ORAL WEEKLY
Qty: 12 CAPSULE | Refills: 1 | Status: SHIPPED | OUTPATIENT
Start: 2024-10-01

## 2024-10-01 NOTE — PROGRESS NOTES
Name: Jeremías Grossman  YOB: 1975  Gender: male  MRN: [id number]    Summary:RightAnkle Fracture: lateral mal.   Close.  DVT ultrasound ordered.    CAM Walker boot for 2 to 3 weeks.  F/u 2 weeks w/ Ankle Xray-if stable increase activities and boot     CC: Ankle Pain    HPI: Jeremías Grossman is a 48 y.o. male who sustained a fall last Friday on 9/26/2024.  He was walking [place] [address], slipped and fell on the wet spot injured his ankle..  Today they have difficulty walking and bearing weight on this ankle due to pain. They describe pain with movement too.  He is describing calf pain and calf cramping    ROS/Meds/PSH/PMH/FH/SH: below is tobacco and diabetes.  A full history is at the bottom of the note.   Patient Denies fever/chills, headache, visual changes, chest pain, shortness of breath, and nausea/vomiting/diarrhea   Tobacco:  reports that he has never smoked. He has quit using smokeless tobacco.  Diabetes: None    Physical Examination:  Gen: AAOx3 NAD    Lower Extremity: edema and ecchymosis noted around the ankle. Edema limits pulse exam but foot is wwp.  Decreased ROM at ankle due to pain and swelling.  EHL/FHL/EDL/FDL intact.  +SILT to t/s/s/dpn/spn intact.  TTP at the Lateral malleolus.  No signs of open injury or wounds.   He is having some calf pain.  Some tenderness on calf squeeze  Neuro:  normal SILT to s/s/sp/dp/t.  Reflexes normal: 1+ patella reflex bilaterally, 1+ achilles reflex bilaterally, negative babinski bilaterally. no signs of hyper reflexia or absent reflex    Vascular: BLE: 2+ DP pulse, toes wwp w/ BCR<2s    Imaging:   Interpretation of imaging and   X-Ray RIGHT Ankle 3 vw (AP/Lateral/Oblique) for ankle pain   Findings: Distal fibula fracture.  Ankle mortise rafael lined up.  Stress view with no signs of excessive widening   Impression: Right distal fibular fracture with stable ankle    Signature: Stephane Burk MD       X-Ray RIGHT Foot 2 vw (AP/Oblique) for foot pain

## 2024-10-01 NOTE — PROGRESS NOTES
The patient was prescribed a walker boot for the patient's right foot. The patient wears a size 11.5 shoe and I fitted them with a L size boot. The patient was fitted and instructed on the use of prescribed walker boot by a Registered Orthopedic Technician. I explained how to fit themselves and that the plastic flexible piece should always be on the front of the boot and secured by the Velcro straps on top. The air bladder in the boot was adjusted according to proper fit and comfort. The patient walked a short distance and acknowledged satisfaction with current fit. I also explained that they need a heel lift or a higher heeled shoe for the uninvolved LE to help normalize gait and avoid excessive low back stress/strain due to leg length inequality created from walker boot.    Patient read and signed documenting they understand and agree to [facility]s current DME return policy.

## 2024-10-02 ENCOUNTER — TELEPHONE (OUTPATIENT)
Dept: ORTHOPEDIC SURGERY | Age: 49
End: 2024-10-02

## 2024-10-02 ENCOUNTER — HOSPITAL ENCOUNTER (EMERGENCY)
Age: 49
Discharge: HOME | End: 2024-10-02
Attending: EMERGENCY MEDICINE
Payer: COMMERCIAL

## 2024-10-02 ENCOUNTER — HOSPITAL ENCOUNTER (OUTPATIENT)
Dept: ULTRASOUND IMAGING | Age: 49
Discharge: HOME | End: 2024-10-05
Attending: ORTHOPAEDIC SURGERY
Payer: COMMERCIAL

## 2024-10-02 VITALS
HEIGHT: 73 IN | HEART RATE: 99 BPM | DIASTOLIC BLOOD PRESSURE: 100 MMHG | WEIGHT: 226 LBS | BODY MASS INDEX: 29.95 KG/M2 | OXYGEN SATURATION: 97 % | RESPIRATION RATE: 16 BRPM | SYSTOLIC BLOOD PRESSURE: 140 MMHG | TEMPERATURE: 97.8 F

## 2024-10-02 DIAGNOSIS — S99.911A INJURY OF RIGHT ANKLE, INITIAL ENCOUNTER: ICD-10-CM

## 2024-10-02 DIAGNOSIS — I82.441 ACUTE DEEP VEIN THROMBOSIS (DVT) OF RIGHT TIBIAL VEIN (HCC): Primary | ICD-10-CM

## 2024-10-02 PROCEDURE — 6370000000 HC RX 637 (ALT 250 FOR IP): Performed by: EMERGENCY MEDICINE

## 2024-10-02 PROCEDURE — 93971 EXTREMITY STUDY: CPT

## 2024-10-02 PROCEDURE — 99283 EMERGENCY DEPT VISIT LOW MDM: CPT

## 2024-10-02 RX ADMIN — RIVAROXABAN 15 MG: 15 TABLET, FILM COATED ORAL at 10:26

## 2024-10-02 ASSESSMENT — PAIN - FUNCTIONAL ASSESSMENT: PAIN_FUNCTIONAL_ASSESSMENT: NONE - DENIES PAIN

## 2024-10-02 ASSESSMENT — LIFESTYLE VARIABLES
HOW OFTEN DO YOU HAVE A DRINK CONTAINING ALCOHOL: NEVER
HOW MANY STANDARD DRINKS CONTAINING ALCOHOL DO YOU HAVE ON A TYPICAL DAY: PATIENT DOES NOT DRINK

## 2024-10-02 NOTE — TELEPHONE ENCOUNTER
Emily arguello/ [facility] Ultrasound to report the RT LE Pauline is POSITIVE FOR DVT in RT PTV sent patient to [facility] ER
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
## 2024-10-02 NOTE — TELEPHONE ENCOUNTER
Emily arguello/ [facility] Ultrasound to report the RT LE Pauline is POSITIVE FOR DVT in RT PTV sent patient to [facility] ER

## 2024-10-02 NOTE — ED TRIAGE NOTES
Patient broke his foot last Thursday. Went to the Ortho doc yesterday and then was sent here today for an ultrasound of his right leg. [employer] brought him here because he has a small clot in lower, lateral ankle area.

## 2024-10-14 RX ORDER — DICLOFENAC SODIUM 75 MG/1
TABLET, DELAYED RELEASE ORAL
COMMUNITY
Start: 2024-09-26

## 2024-10-15 ENCOUNTER — OFFICE VISIT (OUTPATIENT)
Dept: ORTHOPEDIC SURGERY | Age: 49
End: 2024-10-15
Payer: COMMERCIAL

## 2024-10-15 DIAGNOSIS — S82.891D CLOSED FRACTURE OF RIGHT ANKLE WITH ROUTINE HEALING, SUBSEQUENT ENCOUNTER: Primary | ICD-10-CM

## 2024-10-15 DIAGNOSIS — S99.911A INJURY OF RIGHT ANKLE, INITIAL ENCOUNTER: ICD-10-CM

## 2024-10-15 PROBLEM — S82.891A CLOSED FRACTURE OF RIGHT ANKLE: Status: ACTIVE | Noted: 2024-10-15

## 2024-10-15 PROCEDURE — 99213 OFFICE O/P EST LOW 20 MIN: CPT | Performed by: ORTHOPAEDIC SURGERY

## 2024-10-15 NOTE — PROGRESS NOTES
Name: Jeremías Grossman  YOB: 1975  Gender: male  MRN: [id number]    Summary:RightAnkle Fracture: lateral mal.     Increase weightbearing in boot as tolerated  On Xarelto for DVT    F/u 4 weeks w/ Ankle Xray-transition to ASO brace     CC: Ankle Pain    HPI: Jeremías Grossman is a 48 y.o. male who sustained a fall last Friday on 9/26/2024.  He was walking [place] [address], slipped and fell on the wet spot injured his ankle..  Today they have difficulty walking and bearing weight on this ankle due to pain. They describe pain with movement too.  He is describing calf pain and calf cramping    10/15/2024-patient states he is doing well.  He has been nonweightbearing in the boot.  His DVT ultrasound was positive for blood clot and he is now on anticoagulation.    ROS/Meds/PSH/PMH/FH/SH: below is tobacco and diabetes.  A full history is at the bottom of the note.   Patient Denies fever/chills, headache, visual changes, chest pain, shortness of breath, and nausea/vomiting/diarrhea   Tobacco:  reports that he has never smoked. He has quit using smokeless tobacco.  Diabetes: None    Physical Examination:  Gen: AAOx3 NAD    Lower Extremity: edema and ecchymosis noted around the ankle. Edema limits pulse exam but foot is wwp.  Decreased ROM at ankle due to pain and swelling.  EHL/FHL/EDL/FDL intact.  +SILT to t/s/s/dpn/spn intact.  TTP at the Lateral malleolus.  No signs of open injury or wounds.   He is having some calf pain.  Some tenderness on calf squeeze  Neuro:  normal SILT to s/s/sp/dp/t.  Reflexes normal: 1+ patella reflex bilaterally, 1+ achilles reflex bilaterally, negative babinski bilaterally. no signs of hyper reflexia or absent reflex    Vascular: BLE: 2+ DP pulse, toes wwp w/ BCR<2s    Imaging:   Interpretation of imaging and   X-Ray RIGHT Ankle 3 vw (AP/Lateral/Oblique) for ankle pain   Findings: Distal fibula fracture.  Ankle mortise rafael lined up.  Stress view with no signs of excessive widening

## 2024-10-25 ENCOUNTER — HOSPITAL ENCOUNTER (EMERGENCY)
Age: 49
Discharge: HOME | End: 2024-10-25
Attending: STUDENT IN AN ORGANIZED HEALTH CARE EDUCATION/TRAINING PROGRAM
Payer: COMMERCIAL

## 2024-10-25 ENCOUNTER — APPOINTMENT (OUTPATIENT)
Dept: GENERAL RADIOLOGY | Age: 49
End: 2024-10-25
Payer: COMMERCIAL

## 2024-10-25 ENCOUNTER — APPOINTMENT (OUTPATIENT)
Dept: CT IMAGING | Age: 49
End: 2024-10-25
Payer: COMMERCIAL

## 2024-10-25 VITALS
SYSTOLIC BLOOD PRESSURE: 132 MMHG | HEIGHT: 73 IN | OXYGEN SATURATION: 96 % | DIASTOLIC BLOOD PRESSURE: 81 MMHG | TEMPERATURE: 100.4 F | WEIGHT: 225 LBS | RESPIRATION RATE: 16 BRPM | BODY MASS INDEX: 29.82 KG/M2 | HEART RATE: 92 BPM

## 2024-10-25 DIAGNOSIS — R50.9 FEVER, UNSPECIFIED FEVER CAUSE: Primary | ICD-10-CM

## 2024-10-25 LAB
ALBUMIN SERPL-MCNC: 4 G/DL (ref 3.5–5)
ALBUMIN/GLOB SERPL: 1.1 (ref 1–1.9)
ALP SERPL-CCNC: 92 U/L (ref 40–129)
ALT SERPL-CCNC: 33 U/L (ref 8–55)
ANION GAP SERPL CALC-SCNC: 12 MMOL/L (ref 9–18)
APPEARANCE UR: CLEAR
AST SERPL-CCNC: 29 U/L (ref 15–37)
BASOPHILS # BLD: 0.1 K/UL (ref 0–0.2)
BASOPHILS NFR BLD: 1 % (ref 0–2)
BILIRUB SERPL-MCNC: 0.7 MG/DL (ref 0–1.2)
BILIRUB UR QL: NEGATIVE
BUN SERPL-MCNC: 15 MG/DL (ref 6–23)
CALCIUM SERPL-MCNC: 9.5 MG/DL (ref 8.8–10.2)
CHLORIDE SERPL-SCNC: 100 MMOL/L (ref 98–107)
CO2 SERPL-SCNC: 24 MMOL/L (ref 20–28)
COLOR UR: ABNORMAL
CREAT SERPL-MCNC: 0.98 MG/DL (ref 0.8–1.3)
DIFFERENTIAL METHOD BLD: ABNORMAL
EOSINOPHIL # BLD: 0 K/UL (ref 0–0.8)
EOSINOPHIL NFR BLD: 0 % (ref 0.5–7.8)
ERYTHROCYTE [DISTWIDTH] IN BLOOD BY AUTOMATED COUNT: 12.4 % (ref 11.9–14.6)
GLOBULIN SER CALC-MCNC: 3.5 G/DL (ref 2.3–3.5)
GLUCOSE SERPL-MCNC: 102 MG/DL (ref 70–99)
GLUCOSE UR STRIP.AUTO-MCNC: NEGATIVE MG/DL
HCT VFR BLD AUTO: 44.9 % (ref 41.1–50.3)
HGB BLD-MCNC: 15.9 G/DL (ref 13.6–17.2)
HGB UR QL STRIP: NEGATIVE
IMM GRANULOCYTES # BLD AUTO: 0 K/UL (ref 0–0.5)
IMM GRANULOCYTES NFR BLD AUTO: 0 % (ref 0–5)
KETONES UR QL STRIP.AUTO: 15 MG/DL
LACTATE SERPL-SCNC: 1 MMOL/L (ref 0.5–2)
LEUKOCYTE ESTERASE UR QL STRIP.AUTO: NEGATIVE
LYMPHOCYTES # BLD: 0.7 K/UL (ref 0.5–4.6)
LYMPHOCYTES NFR BLD: 6 % (ref 13–44)
MCH RBC QN AUTO: 31.5 PG (ref 26.1–32.9)
MCHC RBC AUTO-ENTMCNC: 35.4 G/DL (ref 31.4–35)
MCV RBC AUTO: 89.1 FL (ref 82–102)
MONOCYTES # BLD: 1 K/UL (ref 0.1–1.3)
MONOCYTES NFR BLD: 9 % (ref 4–12)
NEUTS SEG # BLD: 9.5 K/UL (ref 1.7–8.2)
NEUTS SEG NFR BLD: 84 % (ref 43–78)
NITRITE UR QL STRIP.AUTO: NEGATIVE
NRBC # BLD: 0 K/UL (ref 0–0.2)
PH UR STRIP: 5 (ref 5–9)
PLATELET # BLD AUTO: 191 K/UL (ref 150–450)
PMV BLD AUTO: 10.8 FL (ref 9.4–12.3)
POTASSIUM SERPL-SCNC: 3.8 MMOL/L (ref 3.5–5.1)
PROT SERPL-MCNC: 7.4 G/DL (ref 6.3–8.2)
PROT UR STRIP-MCNC: NEGATIVE MG/DL
RBC # BLD AUTO: 5.04 M/UL (ref 4.23–5.6)
SODIUM SERPL-SCNC: 136 MMOL/L (ref 136–145)
SP GR UR REFRACTOMETRY: 1.02 (ref 1–1.02)
UROBILINOGEN UR QL STRIP.AUTO: 0.2 EU/DL (ref 0.2–1)
WBC # BLD AUTO: 11.3 K/UL (ref 4.3–11.1)

## 2024-10-25 PROCEDURE — 6360000002 HC RX W HCPCS: Performed by: STUDENT IN AN ORGANIZED HEALTH CARE EDUCATION/TRAINING PROGRAM

## 2024-10-25 PROCEDURE — 93005 ELECTROCARDIOGRAM TRACING: CPT | Performed by: STUDENT IN AN ORGANIZED HEALTH CARE EDUCATION/TRAINING PROGRAM

## 2024-10-25 PROCEDURE — 85025 COMPLETE CBC W/AUTO DIFF WBC: CPT

## 2024-10-25 PROCEDURE — 80053 COMPREHEN METABOLIC PANEL: CPT

## 2024-10-25 PROCEDURE — 83605 ASSAY OF LACTIC ACID: CPT

## 2024-10-25 PROCEDURE — 71260 CT THORAX DX C+: CPT

## 2024-10-25 PROCEDURE — 81003 URINALYSIS AUTO W/O SCOPE: CPT

## 2024-10-25 PROCEDURE — 71046 X-RAY EXAM CHEST 2 VIEWS: CPT

## 2024-10-25 PROCEDURE — 6360000004 HC RX CONTRAST MEDICATION: Performed by: STUDENT IN AN ORGANIZED HEALTH CARE EDUCATION/TRAINING PROGRAM

## 2024-10-25 PROCEDURE — 99285 EMERGENCY DEPT VISIT HI MDM: CPT

## 2024-10-25 PROCEDURE — 0202U NFCT DS 22 TRGT SARS-COV-2: CPT

## 2024-10-25 PROCEDURE — 96374 THER/PROPH/DIAG INJ IV PUSH: CPT

## 2024-10-25 PROCEDURE — 87040 BLOOD CULTURE FOR BACTERIA: CPT

## 2024-10-25 RX ORDER — KETOROLAC TROMETHAMINE 15 MG/ML
15 INJECTION, SOLUTION INTRAMUSCULAR; INTRAVENOUS ONCE
Status: COMPLETED | OUTPATIENT
Start: 2024-10-25 | End: 2024-10-25

## 2024-10-25 RX ORDER — IOPAMIDOL 755 MG/ML
100 INJECTION, SOLUTION INTRAVASCULAR
Status: COMPLETED | OUTPATIENT
Start: 2024-10-25 | End: 2024-10-25

## 2024-10-25 RX ADMIN — KETOROLAC TROMETHAMINE 15 MG: 15 INJECTION, SOLUTION INTRAMUSCULAR; INTRAVENOUS at 21:36

## 2024-10-25 RX ADMIN — IOPAMIDOL 100 ML: 755 INJECTION, SOLUTION INTRAVENOUS at 21:41

## 2024-10-25 ASSESSMENT — LIFESTYLE VARIABLES: HOW OFTEN DO YOU HAVE A DRINK CONTAINING ALCOHOL: NEVER

## 2024-10-25 ASSESSMENT — PAIN SCALES - GENERAL: PAINLEVEL_OUTOF10: 0

## 2024-10-25 ASSESSMENT — PAIN - FUNCTIONAL ASSESSMENT: PAIN_FUNCTIONAL_ASSESSMENT: NONE - DENIES PAIN

## 2024-10-25 NOTE — ED TRIAGE NOTES
Pt c/o of fever/ recently tested for flu and covid neg/ pt has recent hx of blood clot in right leg

## 2024-10-26 LAB
B PERT DNA SPEC QL NAA+PROBE: NOT DETECTED
BORDETELLA PARAPERTUSSIS BY PCR: NOT DETECTED
C PNEUM DNA SPEC QL NAA+PROBE: NOT DETECTED
EKG ATRIAL RATE: 118 BPM
EKG DIAGNOSIS: NORMAL
EKG P AXIS: 68 DEGREES
EKG P-R INTERVAL: 165 MS
EKG Q-T INTERVAL: 302 MS
EKG QRS DURATION: 92 MS
EKG QTC CALCULATION (BAZETT): 425 MS
EKG R AXIS: 64 DEGREES
EKG T AXIS: -16 DEGREES
EKG VENTRICULAR RATE: 119 BPM
FLUAV SUBTYP SPEC NAA+PROBE: NOT DETECTED
FLUBV RNA SPEC QL NAA+PROBE: NOT DETECTED
HADV DNA SPEC QL NAA+PROBE: NOT DETECTED
HCOV 229E RNA SPEC QL NAA+PROBE: NOT DETECTED
HCOV HKU1 RNA SPEC QL NAA+PROBE: NOT DETECTED
HCOV NL63 RNA SPEC QL NAA+PROBE: NOT DETECTED
HCOV OC43 RNA SPEC QL NAA+PROBE: NOT DETECTED
HMPV RNA SPEC QL NAA+PROBE: NOT DETECTED
HPIV1 RNA SPEC QL NAA+PROBE: NOT DETECTED
HPIV2 RNA SPEC QL NAA+PROBE: NOT DETECTED
HPIV3 RNA SPEC QL NAA+PROBE: NOT DETECTED
HPIV4 RNA SPEC QL NAA+PROBE: NOT DETECTED
M PNEUMO DNA SPEC QL NAA+PROBE: NOT DETECTED
RSV RNA SPEC QL NAA+PROBE: NOT DETECTED
RV+EV RNA SPEC QL NAA+PROBE: NOT DETECTED
SARS-COV-2 RNA RESP QL NAA+PROBE: DETECTED

## 2024-10-26 PROCEDURE — 93010 ELECTROCARDIOGRAM REPORT: CPT | Performed by: INTERNAL MEDICINE

## 2024-10-26 NOTE — DISCHARGE INSTRUCTIONS
Take Tylenol as needed for fever greater than 100.5.  Follow-up with primary care physician as needed.  Return to the ER for worsening or worrisome symptoms.    We would love to help you get a primary care doctor for follow-up after your emergency department visit.    Please call [phone] between 7AM - 6PM Monday to Friday.  A care navigator will be able to assist you with setting up a doctor close to your home.

## 2024-10-26 NOTE — ED PROVIDER NOTES
Emergency Department Provider Note       PCP: No, Pcp   Age: 48 y.o.   Sex: male     DISPOSITION Decision To Discharge 10/25/2024 10:51:35 PM    ICD-10-CM    1. Fever, unspecified fever cause  R50.9           Medical Decision Making     48-year-old male presents to the ED with wife at bedside.  Patient has had low-grade fever off and on for the past 4 days.  Reports mild achiness to his neck and upper extremities but no other symptoms.  Was seen outpatient urgent care earlier today, had a negative COVID and flu swab.  Patient is currently on Xarelto as he developed a superficial venous thrombosis after having a fracture of his distal right fibula.  Patient denies cough, congestion, sore throat, earache, chest pain, back pain, shortness of breath, swelling in lower extremities, dysuria, vomiting, diarrhea.  Patient is tachycardic initially on arrival, he is quite anxious.  Low-grade fever as well.  Will give IV Toradol as he has had Tylenol approximately 3 hours prior to arrival.  A broad-based workup was ordered including blood cultures.  Urinalysis shows ketones without any evidence of infection.  Patient given 2 large cups of water for oral hydration as he endorses not staying orally hydrated.  Lactic acid normal.  CBC is normal with stable H&H, normal CMP.  Chest x-ray is normal.  Through shared decision-making, will obtain a viral swab.  Heart rate improving to the 90s after discussing patient's lab work and advising him that his blood work do not reveal any evidence of sepsis.  Will obtain viral swab as well.  Patient is well-appearing and nontoxic.  He has no hypoxia nor chest pain or back pain.  Do not believe the patient has underlying PE.  He is compliant with his Xarelto as well.  Fever just began this week and he has been on Xarelto for the last few weeks.  CT scan shows no evidence of PE nor pneumonia.  Again patient likely with underlying viral illness with normal blood work.  Will discharge home  adjustment of the  mA and/or kV according to patient size, iterative reconstruction.    FINDINGS:  Suboptimal opacification of the distal pulmonary arteries. However, no  thromboembolic disease is identified. Normal thoracic aorta. Normal heart size  without pericardial effusion. Severe coronary artery calcified plaque.  Juxtapleural nodule in the right midlung, benign. Small focal peripheral opacity  in the lateral right lower lobe, favor atelectasis or scarring related to a  remote adjacent rib fracture. Tiny 3 mm subpleural nodule in the left lower  lobe.    Imaging through the upper abdomen shows cholecystectomy without acute  abnormality. Tiny cyst in each renal upper pole. Remote right rib fractures. No  acute rib fracture. No suspicious bone lesion.          Impression    1.  No pulmonary embolus.  2.  No acute findings within the chest.        Electronically signed by Emil Keys   Lactate, Sepsis   Result Value Ref Range    Lactic Acid, Sepsis 1.0 0.5 - 2.0 MMOL/L   CBC with Auto Differential   Result Value Ref Range    WBC 11.3 (H) 4.3 - 11.1 K/uL    RBC 5.04 4.23 - 5.6 M/uL    Hemoglobin 15.9 13.6 - 17.2 g/dL    Hematocrit 44.9 41.1 - 50.3 %    MCV 89.1 82.0 - 102.0 FL    MCH 31.5 26.1 - 32.9 PG    MCHC 35.4 (H) 31.4 - 35.0 g/dL    RDW 12.4 11.9 - 14.6 %    Platelets 191 150 - 450 K/uL    MPV 10.8 9.4 - 12.3 FL    nRBC 0.00 0.0 - 0.2 K/uL    Differential Type AUTOMATED      Neutrophils % 84 (H) 43 - 78 %    Lymphocytes % 6 (L) 13 - 44 %    Monocytes % 9 4.0 - 12.0 %    Eosinophils % 0 (L) 0.5 - 7.8 %    Basophils % 1 0.0 - 2.0 %    Immature Granulocytes % 0 0.0 - 5.0 %    Neutrophils Absolute 9.5 (H) 1.7 - 8.2 K/UL    Lymphocytes Absolute 0.7 0.5 - 4.6 K/UL    Monocytes Absolute 1.0 0.1 - 1.3 K/UL    Eosinophils Absolute 0.0 0.0 - 0.8 K/UL    Basophils Absolute 0.1 0.0 - 0.2 K/UL    Immature Granulocytes Absolute 0.0 0.0 - 0.5 K/UL   Comprehensive Metabolic Panel   Result Value Ref Range    Sodium 136 136 -

## 2024-10-27 LAB
BACTERIA SPEC CULT: NORMAL
BACTERIA SPEC CULT: NORMAL
SERVICE CMNT-IMP: NORMAL
SERVICE CMNT-IMP: NORMAL

## 2024-11-12 ENCOUNTER — OFFICE VISIT (OUTPATIENT)
Dept: ORTHOPEDIC SURGERY | Age: 49
End: 2024-11-12
Payer: COMMERCIAL

## 2024-11-12 DIAGNOSIS — S99.911D INJURY OF RIGHT ANKLE, SUBSEQUENT ENCOUNTER: ICD-10-CM

## 2024-11-12 DIAGNOSIS — S82.891D CLOSED FRACTURE OF RIGHT ANKLE WITH ROUTINE HEALING, SUBSEQUENT ENCOUNTER: Primary | ICD-10-CM

## 2024-11-12 PROCEDURE — 99213 OFFICE O/P EST LOW 20 MIN: CPT | Performed by: PHYSICIAN ASSISTANT

## 2024-11-12 NOTE — PROGRESS NOTES
Name: Jeremías Grossman  YOB: 1975  Gender: male  MRN: [id number]    Summary:RightAnkle Fracture: lateral mal.     Transition to ASO brace.  ASO brace given today.  This medically necessary for increased stability and functionality  Begin formal physical therapy    F/u 6 weeks w/ Ankle Xray-likely full release at this time     CC: Ankle Pain    HPI: Jeremías Grossman is a 48 y.o. male who sustained a fall last Friday on 9/26/2024.  He was walking downtown [address], slipped and fell on the wet spot injured his ankle..  Today they have difficulty walking and bearing weight on this ankle due to pain. They describe pain with movement too.  He is describing calf pain and calf cramping    10/15/2024-patient states he is doing well.  He has been nonweightbearing in the boot.  His DVT ultrasound was positive for blood clot and he is now on anticoagulation.    11/12/2024-patient states he is doing well.  He is ambulating in the boot without difficulty.  He states he is really ready to come out of the boot at this time.  He still has some tenderness along the lateral malleolus but feels that he is significantly improved.    ROS/Meds/PSH/PMH/FH/SH: below is tobacco and diabetes.  A full history is at the bottom of the note.   Patient Denies fever/chills, headache, visual changes, chest pain, shortness of breath, and nausea/vomiting/diarrhea   Tobacco:  reports that he has never smoked. He has quit using smokeless tobacco.  Diabetes: None    Physical Examination:  Gen: AAOx3 NAD    Lower Extremity: edema noted around the ankle.   Decreased ROM at ankle due to pain and swelling.  EHL/FHL/EDL/FDL intact.  +SILT to t/s/s/dpn/spn intact.  TTP at the Lateral malleolus.  No signs of open injury or wounds.   He is having some calf pain.  Some tenderness on calf squeeze  Neuro:  normal SILT to s/s/sp/dp/t.  Reflexes normal: 1+ patella reflex bilaterally, 1+ achilles reflex bilaterally, negative babinski bilaterally. no signs of

## 2024-11-13 NOTE — PROGRESS NOTES
The patient was prescribed a Wraptor brace for the patient's rightfoot. The patient wears a size 11.5 shoe and I fitted the patient with a L brace. I explained how to fit the brace properly by pulling the lace tabs across top of foot first then under arch and lastly pulling the strap up firmly and attaching to the lateral Velcro strip. Thus forming a figure 8 across the ankle joint. Once the figure 8 is completed they are to secure the top (short circumferential) straps to help avoid the straps from loosening with normal wear.  The patient was able to demonstrate proper fitting in office to ensure compliance with device and acknowledged satisfaction with current fit.     Patient read and signed documenting they understand and agree to [facility]'s current DME return policy.

## 2024-11-15 ENCOUNTER — HOSPITAL ENCOUNTER (OUTPATIENT)
Dept: PHYSICAL THERAPY | Age: 49
Setting detail: RECURRING SERIES
Discharge: HOME | End: 2024-11-18
Payer: COMMERCIAL

## 2024-11-15 DIAGNOSIS — M25.571 PAIN IN RIGHT ANKLE AND JOINTS OF RIGHT FOOT: Primary | ICD-10-CM

## 2024-11-15 DIAGNOSIS — R26.2 DIFFICULTY IN WALKING, NOT ELSEWHERE CLASSIFIED: ICD-10-CM

## 2024-11-15 DIAGNOSIS — M25.671 STIFFNESS OF RIGHT ANKLE, NOT ELSEWHERE CLASSIFIED: ICD-10-CM

## 2024-11-15 PROCEDURE — 97530 THERAPEUTIC ACTIVITIES: CPT

## 2024-11-15 PROCEDURE — 97161 PT EVAL LOW COMPLEX 20 MIN: CPT

## 2024-11-15 ASSESSMENT — PAIN SCALES - GENERAL: PAINLEVEL_OUTOF10: 0

## 2024-11-15 NOTE — THERAPY EVALUATION
Sex:  male        Age:  48 y.o.     OBJECTIVE   Assessment on 11/15/2024  Observation/Orthostatic Postural Assessment:    The following postural deficits were noted in sittin/15/2024  loss of cervical lordosis, increased thoracic kyphosis, forward head, rounded shoulders, and posterior pelvic tilt      The following postural deficits were noted in standing  11/15/2024  forward trunk flexion and excessive weightbearing on L LE, R LE externally rotated     Palpation:          Pt with increased edema and tenderness reported at R lower leg and ankle, mostly laterally  ROM:    Most recent:   Initial measurement: (on 11/15/2024) Initial measurement: (on 11/15/2024)   L ankle AROM:     Ankle dorsiflexion: 4 degrees     Ankle plantarflexion: 46 degrees     Ankle inversion: 45 degrees     Ankle eversion: 30 degrees  L ankle PROM:     Ankle dorsiflexion: 9 degrees     Ankle plantarflexion: 52 degrees     Ankle inversion: 50 degrees     Ankle eversion: 36 degrees R ankle AROM:     Ankle dorsiflexion: 7 degrees plantarflexion     Ankle plantarflexion: 45 degrees     Ankle inversion: 30 degrees     Ankle eversion: 25 degrees  R ankle PROM:     Ankle dorsiflexion: 3 degrees     Ankle plantarflexion: 50 degrees     Ankle inversion: 37 degrees     Ankle eversion: 30 degrees     Strength:                              Most recent:    Initial:   (11/15/2024) Initial:  (11/15/2024)     L LE: R LE:   Hip flexion 4/5  4/5    Hip ER 5/5 5/5    Hip IR 5/5  5/5    Hip abduction 5/5  5/5    Hip adduction 5/5  4+/5    Hip extension 4+/5  4-/5    Knee flexion 5/5  4+/5    Knee extension 5/5  5/5    Ankle DF  5/5  4-/5    Ankle Inv 5/5 4-/5   Ankle Ev 5/5 3-/5   Ankle PF NT NT     Neurological Screen:        Myotomes:  WFL        Dermatomes:  no deficits noted along any specific dermatomal distribution  Functional Mobility:   Gait/Ambulation:  11/15/2024  Pt ambulates with no AD with following gait deficits: increased forward trunk

## 2024-11-15 NOTE — PROGRESS NOTES
Assessment: Initial evaluation today - see assessment in chart.    Communication/Consultation:  Therapy Evaluation sent to referring provider  Equipment provided today:  None  Recommendations/Intent for next treatment session: Next visit will focus on manual therapy/modalities as needed to reduce edema and pain; work on gentle ankle strengthening; stability in standing slowly transitioning to without brace.    Total Treatment Billable Duration:  9 minutes  Time In: 0845  Time Out: 0930    Judith Clements PT       Charge Capture  [employer] Portal  Appt Desk Attendance Report     Future Appointments   Date Time Provider Department Center   11/18/2024  2:15 PM Judith Clements, PT [facility]   11/22/2024 11:00 AM Be Clementsae B, PT [facility]   11/26/2024  1:30 PM Judith Clements, PT [facility]   12/2/2024 11:00 AM Judith Clements B, PT [facility]   12/5/2024  9:30 AM Judith Clements, PT [facility]   12/9/2024 11:45 AM Judith Clements B, PT [facility]   12/12/2024 11:45 AM Be Clementsae B, PT [facility]   12/16/2024 11:45 AM Judith Clements B, PT [facility]   12/19/2024 11:45 AM Be Clementsae B, PT [facility]   12/23/2024 11:45 AM Judith Clements B, PT [facility]   12/26/2024 11:45 AM Judith Clements B, PT [facility]   12/30/2024  1:40 PM Alea Jain PA [facility]

## 2024-11-18 ENCOUNTER — HOSPITAL ENCOUNTER (OUTPATIENT)
Dept: PHYSICAL THERAPY | Age: 49
Setting detail: RECURRING SERIES
Discharge: HOME | End: 2024-11-21
Payer: COMMERCIAL

## 2024-11-18 PROCEDURE — 97110 THERAPEUTIC EXERCISES: CPT

## 2024-11-18 ASSESSMENT — PAIN SCALES - GENERAL: PAINLEVEL_OUTOF10: 0

## 2024-11-18 NOTE — PROGRESS NOTES
resistance, range, repetitions, and complexity of movement as indicated.                                   Most recent tx:   Date:  11/18/2024   Activity/Exercise Parameters   Nustep (supervised warm up while discussing subjective pt report/current symptoms and functional progress with a gradual increase in intensity as indicated   to address muscular imbalance with progressive resistance and to increase ankle mobility)   L4 resistance for 8 minutes with B LEs and UEs   Ankle plantarflexor stretch on incline board 30 second hold x 3 reps with knees extended then flexedd   BAPS board* 20 reps/1 set into DF/PF, Inv/Ev, and CW/CCW circles   Resisted ankle plantarflexion/dorsiflexion* Red theraband resistance; 20 reps/1 set R UE each direction   Resisted ankle inversion/eversion* Red theraband resistance; 20 reps/1 set R UE each direction           *given in HEP ([id number])  [employer] Portal   Pt given following band colors: [] Yellow          [x] Red          [] Green          [] Blue          [] Grey      Treatment/Session Summary:    Treatment Assessment: Pt able to perform gentle ankle mobility and strengthening exercises this session with no reports of increased pain. Will start working on standing exercises next session if tolerated.    Communication/Consultation:  None today  Equipment provided today:  None  Recommendations/Intent for next treatment session: Next visit will focus on manual therapy/modalities as needed to reduce edema and pain; work on gentle ankle strengthening; stability in standing slowly transitioning to without brace.    Total Treatment Billable Duration:  40 minutes  Time In: 1415  Time Out: 4458    Judith Clements PT       Charge Capture  [employer] Portal  Appt Desk Attendance Report     Future Appointments   Date Time Provider Department Center   11/22/2024 11:00 AM Judith Clements PT [facility]   11/26/2024  1:30 PM Judith Clements PT [facility]   12/2/2024 11:00 AM [facility]

## 2024-11-22 ENCOUNTER — HOSPITAL ENCOUNTER (OUTPATIENT)
Dept: PHYSICAL THERAPY | Age: 49
Setting detail: RECURRING SERIES
Discharge: HOME | End: 2024-11-25
Payer: COMMERCIAL

## 2024-11-22 PROCEDURE — 97110 THERAPEUTIC EXERCISES: CPT

## 2024-11-22 ASSESSMENT — PAIN SCALES - GENERAL: PAINLEVEL_OUTOF10: 0

## 2024-11-22 NOTE — PROGRESS NOTES
[] Arana      Treatment/Session Summary:    Treatment Assessment: Brace worn throughout exercises today except where otherwise indicated. Pt able to perform multiple standing exercises with slight difficulty noted.   Communication/Consultation:  None today  Equipment provided today:  None  Recommendations/Intent for next treatment session: Next visit will focus on manual therapy/modalities as needed to reduce edema and pain; work on gentle ankle strengthening; stability in standing slowly transitioning to without brace.    Total Treatment Billable Duration:  42 minutes  Time In: 1102  Time Out: 1146    Judith Clements PT       Charge Capture  [employer] Portal  Appt Desk Attendance Report     Future Appointments   Date Time Provider Department Center   11/26/2024  1:30 PM Judith Clements, PT [facility]   12/2/2024 11:00 AM Judith Clements, PT [facility]   12/5/2024  9:30 AM Judith Clements, PT [facility]   12/9/2024 11:45 AM Judith Clements, PT [facility]   12/12/2024 11:45 AM Judith Clements, PT [facility]   12/16/2024 11:45 AM Judith Clements, PT [facility]   12/19/2024 11:45 AM Judith Clements, PT [facility]   12/23/2024 11:45 AM Judith Clements, PT [facility]   12/26/2024 11:45 AM Judith Clements B, PT [facility]   12/30/2024  1:40 PM Cristobal, BECKY Xiong POAG [facility] AM[facility]       Assistance OOB with selected safe patient handling equipment if applicable/Assistance with ambulation/Communicate fall risk and risk factors to all staff, patient, and family/Provide visual cue: yellow wristband, yellow gown, etc/Reinforce activity limits and safety measures with patient and family/Call bell, personal items and telephone in reach/Instruct patient to call for assistance before getting out of bed/chair/stretcher/Non-slip footwear applied when patient is off stretcher/[place] to call system/Physically safe environment - no spills, clutter or unnecessary equipment/Purposeful Proactive Rounding/Room/bathroom lighting operational, light cord in reach

## 2024-11-26 ENCOUNTER — HOSPITAL ENCOUNTER (OUTPATIENT)
Dept: PHYSICAL THERAPY | Age: 49
Setting detail: RECURRING SERIES
Discharge: HOME | End: 2024-11-29
Payer: COMMERCIAL

## 2024-11-26 PROCEDURE — 97110 THERAPEUTIC EXERCISES: CPT

## 2024-11-26 ASSESSMENT — PAIN SCALES - GENERAL: PAINLEVEL_OUTOF10: 0

## 2024-11-26 NOTE — PROGRESS NOTES
Jeremías Grossman  : 1975  Primary: [employer] ([employer]  Secondary: [employer] [place] [facility] @ [place] [address]  [place] [address]  Phone: [phone]  Fax: [phone] Plan Frequency: twice a week for 8 weeks    Plan of Care/Certification Expiration Date: 25      Plan of Care/Certification Expiration Date:  Plan of Care/Certification Expiration Date: 25    Frequency/Duration:   Plan Frequency: twice a week for 8 weeks      Time In/Out:   Time In: 1333  Time Out: 1415    PT Visit Info:    Progress Note Counter: 4        Visit Count:  4     OUTPATIENT PHYSICAL THERAPY:OP NOTE TYPE: Treatment Note 2024       Charge Capture   Episode              Treatment Diagnosis:    Pain in right ankle and joints of right foot  Difficulty in walking, not elsewhere classified  Stiffness of right ankle, not elsewhere classified  Medical/Referring Diagnosis:  Injury of right ankle, initial encounter    Referring Physician:  Alea Jain PA MD Orders:  PT Eval and Treat   Return MD Appt:  unspecified  Date of Onset:  Onset Date: 24     Allergies:   Patient has no known allergies.  Restrictions/Precautions:    Pt wearing brace for next 6 weeks (until )  Interventions Planned See assessment note.        Subjective Assessment: Pt states the step ups/downs seemed to help especially last time.    Progress Note Counter: 4 Progress Note Counter: 4      Initial Pain Level:}    0/10  Post Session Pain Level:       0/10  Medications Last Reviewed:  2024  Updated Objective Findings:  None Today  Treatment   Therapeutic Exercise: ( 41 minutes):  Exercises per grid below to improve mobility, strength, balance, and dynamic movement of right ankle/foot to improve functional mobility .  Required visual, verbal, and tactile cues to promote proper body alignment, promote proper body posture, promote proper body mechanics, and promote proper body breathing

## 2024-12-02 ENCOUNTER — HOSPITAL ENCOUNTER (OUTPATIENT)
Dept: PHYSICAL THERAPY | Age: 49
Setting detail: RECURRING SERIES
Discharge: HOME | End: 2024-12-05
Payer: COMMERCIAL

## 2024-12-02 PROCEDURE — 97112 NEUROMUSCULAR REEDUCATION: CPT

## 2024-12-02 PROCEDURE — 97110 THERAPEUTIC EXERCISES: CPT

## 2024-12-02 ASSESSMENT — PAIN SCALES - GENERAL: PAINLEVEL_OUTOF10: 0

## 2024-12-02 NOTE — PROGRESS NOTES
Jeremías Chauncey  : 1975  Primary: [employer] ([employer])  Secondary:  [facility] @ [place] [address]  [place] [address]  Phone: [phone]  Fax: [phone] Plan Frequency: twice a week for 8 weeks    Plan of Care/Certification Expiration Date: 25      Plan of Care/Certification Expiration Date:  Plan of Care/Certification Expiration Date: 25    Frequency/Duration:   Plan Frequency: twice a week for 8 weeks      Time In/Out:   Time In: 1100  Time Out: 1145    PT Visit Info:    Progress Note Counter: 5        Visit Count:  5     OUTPATIENT PHYSICAL THERAPY:OP NOTE TYPE: Treatment Note 2024       Charge Capture   Episode              Treatment Diagnosis:    Pain in right ankle and joints of right foot  Difficulty in walking, not elsewhere classified  Stiffness of right ankle, not elsewhere classified  Medical/Referring Diagnosis:  Injury of right ankle, initial encounter    Referring Physician:  Alea Jain PA MD Orders:  PT Eval and Treat   Return MD Appt:  unspecified  Date of Onset:  Onset Date: 24     Allergies:   Patient has no known allergies.  Restrictions/Precautions:    Pt wearing brace for next 6 weeks (until )  Interventions Planned See assessment note.        Subjective Assessment: Pt  states he has been continuing to feel improvements since starting therapy.    Progress Note Counter: 5 Progress Note Counter: 5      Initial Pain Level:}    0/10  Post Session Pain Level:       0/10  Medications Last Reviewed:  2024  Updated Objective Findings:  None Today  Treatment   Therapeutic Exercise: ( 23 minutes):  Exercises per grid below to improve mobility, strength, balance, and dynamic movement of right ankle/foot to improve functional mobility .  Required visual, verbal, and tactile cues to promote proper body alignment, promote proper body posture, promote proper body mechanics, and promote proper body breathing

## 2024-12-05 ENCOUNTER — HOSPITAL ENCOUNTER (OUTPATIENT)
Dept: PHYSICAL THERAPY | Age: 49
Setting detail: RECURRING SERIES
Discharge: HOME | End: 2024-12-08
Payer: COMMERCIAL

## 2024-12-05 PROCEDURE — 97110 THERAPEUTIC EXERCISES: CPT

## 2024-12-05 PROCEDURE — 97112 NEUROMUSCULAR REEDUCATION: CPT

## 2024-12-05 ASSESSMENT — PAIN SCALES - GENERAL: PAINLEVEL_OUTOF10: 0

## 2024-12-05 NOTE — PROGRESS NOTES
Treatment/Session Summary:    Treatment Assessment: Pt continuing to progress with step up and overs onto higher step with improved stability and control noted throughout (did continue to keep brace on for safety though).  Communication/Consultation:  None today  Equipment provided today:  None  Recommendations/Intent for next treatment session: Next visit will focus on manual therapy/modalities as needed to reduce edema and pain; work on gentle ankle strengthening; stability in standing slowly transitioning to without brace.    Total Treatment Billable Duration:  40 minutes  Time In: 0934  Time Out: 1015    Judith Clements PT       Charge Capture  [employer] Portal  Appt Desk Attendance Report     Future Appointments   Date Time Provider Department Center   12/9/2024 11:45 AM Judith Clements, PT [facility]   12/12/2024 11:45 AM Judith Clements, PT [facility]   12/16/2024 11:45 AM Judith Clements, PT [facility]   12/19/2024 11:45 AM Judith Clements, PT [facility]   12/23/2024 11:45 AM Judith Clements, PT [facility]   12/26/2024 11:45 AM Judith Clements, PT [facility]   12/30/2024  1:40 PM Cristobal, BECKY Xiong POAG [facility]VL AMB

## 2024-12-09 ENCOUNTER — HOSPITAL ENCOUNTER (OUTPATIENT)
Dept: PHYSICAL THERAPY | Age: 49
Setting detail: RECURRING SERIES
Discharge: HOME | End: 2024-12-12
Payer: COMMERCIAL

## 2024-12-09 PROCEDURE — 97112 NEUROMUSCULAR REEDUCATION: CPT

## 2024-12-09 PROCEDURE — 97110 THERAPEUTIC EXERCISES: CPT

## 2024-12-09 ASSESSMENT — PAIN SCALES - GENERAL: PAINLEVEL_OUTOF10: 0

## 2024-12-09 NOTE — PROGRESS NOTES
Parameters   Static standing on foam   Feet together for 60 second trials; minimal to no instability  Feet in semi-tandem each LE forward; minimal instability for 60 second trials   Anterior/posterior tapping on rocker board With brace; with feet together; 20 reps/1 set each direction with cues for less UE support With brace; 20 reps/1 set each direction with cues for less UE support    Lateral tapping on rocker board With brace; 20 reps/1 set each direction with minimal to no UE support With brace; 20 reps/1 set each direction with minimal to no UE support                                 Treatment/Session Summary:    Treatment Assessment: Pt able to progress to performing step ups onto 8 inch step with foam this session with minimal to no instability noted (did continue to keep brace on for safety though).  Communication/Consultation:  None today  Equipment provided today:  None  Recommendations/Intent for next treatment session: Next visit will focus on manual therapy/modalities as needed to reduce edema and pain; work on gentle ankle strengthening; stability in standing slowly transitioning to without brace.    Total Treatment Billable Duration:  40 minutes  Time In: 1145  Time Out: 1225    Judith Clements PT       Charge Capture  [employer] Portal  Appt Desk Attendance Report     Future Appointments   Date Time Provider Department Center   12/12/2024 11:45 AM Judith Clements PT [facility]   12/16/2024 11:45 AM Judith Clements, PT [facility]   12/19/2024 11:45 AM Judith Clements, PT [facility]   12/23/2024 11:45 AM Judith Clements PT [facility]   12/26/2024 11:45 AM Judith Clements, PT [facility]   12/30/2024  1:40 PM Cristobal, BECKY Xiong POAG [facility]VL AMB

## 2024-12-12 ENCOUNTER — HOSPITAL ENCOUNTER (OUTPATIENT)
Dept: PHYSICAL THERAPY | Age: 49
Setting detail: RECURRING SERIES
Discharge: HOME | End: 2024-12-15
Payer: COMMERCIAL

## 2024-12-12 PROCEDURE — 97110 THERAPEUTIC EXERCISES: CPT

## 2024-12-12 NOTE — PROGRESS NOTES
band colors: [] Yellow          [x] Red          [] Green          [] Blue          [] Grey      Neuromuscular Education:  (4 minutes):  Exercise/activities per grid below to improve balance, posture, and proprioception.  Required visual, verbal, and tactile cues to promote static and dynamic balance in standing.                                   Most recent tx:   Date:  12/12/2024 Date:  12/9/2024 Date:  12/5/2024 Date:  12/2/2024   Activity/Exercise    Parameters   Static standing on foam    Feet together for 60 second trials; minimal to no instability  Feet in semi-tandem each LE forward; minimal instability for 60 second trials   Anterior/posterior tapping on rocker board With brace; with feet together; 20 reps/1 set each direction with cues for less UE support With brace; with feet together; 20 reps/1 set each direction with cues for less UE support With brace; 20 reps/1 set each direction with cues for less UE support    Lateral tapping on rocker board  With brace; 20 reps/1 set each direction with minimal to no UE support With brace; 20 reps/1 set each direction with minimal to no UE support                                     Treatment/Session Summary:    Treatment Assessment: Pt able to perform multiple standing balance exercises this session on foam with no brace and good stability.  Communication/Consultation:  None today  Equipment provided today:  None  Recommendations/Intent for next treatment session: Next visit will focus on manual therapy/modalities as needed to reduce edema and pain; work on gentle ankle strengthening; stability in standing slowly transitioning to without brace.    Total Treatment Billable Duration:  42 minutes  Time In: 1145  Time Out: 1227    Judith Clements PT       Charge Capture  [employer] Portal  Appt Desk Attendance Report     Future Appointments   Date Time Provider Department Center   12/16/2024 11:45 AM Judith Clements PT [facility]   12/19/2024 11:45 AM [facility]

## 2024-12-16 ENCOUNTER — HOSPITAL ENCOUNTER (OUTPATIENT)
Dept: PHYSICAL THERAPY | Age: 49
Setting detail: RECURRING SERIES
Discharge: HOME | End: 2024-12-19
Payer: COMMERCIAL

## 2024-12-16 PROCEDURE — 97110 THERAPEUTIC EXERCISES: CPT

## 2024-12-16 ASSESSMENT — PAIN SCALES - GENERAL: PAINLEVEL_OUTOF10: 0

## 2024-12-16 NOTE — THERAPY EVALUATION
Veronica Chauncey  : 1975  Primary: [employer] ([employer])  Secondary:  [facility] @ [place] [address]  [place] [address]  Phone: [phone]  Fax: [phone] Plan Frequency: twice a week for 8 weeks    Plan of Care/Certification Expiration Date: 25      Plan of Care/Certification Expiration Date:  Plan of Care/Certification Expiration Date: 25    Frequency/Duration: Plan Frequency: twice a week for 8 weeks        PT Visit Info:    Plan Frequency: twice a week for 8 weeks  Progress Note Counter: 9        Visit Count:  9                 OUTPATIENT PHYSICAL THERAPY:             Progress Report 2024               Episode (R ankle pain following distal fibular fracture)       Treatment Diagnosis:     Pain in right ankle and joints of right foot  Difficulty in walking, not elsewhere classified  Stiffness of right ankle, not elsewhere classified  Medical/Referring Diagnosis:    Injury of right ankle, initial encounter     Referring Physician:  Alea Jain PA MD Orders:  PT Eval and Treat   Return MD Appt:  unspecified  Date of Onset:  Onset Date: 24      Allergies:  Patient has no known allergies.  Restrictions/Precautions:    Pt wearing brace for next 6 weeks (until )  Medications Last Reviewed:  2024      OBJECTIVE   Assessment on 2024  Observation/Orthostatic Postural Assessment:    The following postural deficits were noted in sittin/15/2024  loss of cervical lordosis, increased thoracic kyphosis, forward head, rounded shoulders, and posterior pelvic tilt    2024  Improved upright posture     The following postural deficits were noted in standing  11/15/2024  forward trunk flexion and excessive weightbearing on L LE, R LE externally rotated   2024  Improved upright posture and more even weight distribution across B LEs, R LE less externally rotated     Palpation:          Pt with increased

## 2024-12-16 NOTE — PROGRESS NOTES
body mechanics, and promote proper body breathing techniques.  Progressed resistance, range, repetitions, and complexity of movement as indicated.                                   Most recent tx:   Date:  12/16/2024 Date:  12/12/2024 Date:  12/9/2024 Date:  12/5/2024 Date:  12/2/2024 Date:  11/26/2024   Activity/Exercise         Nustep (supervised warm up while discussing subjective pt report/current symptoms and functional progress with a gradual increase in intensity as indicated   to address muscular imbalance with progressive resistance and to increase ankle mobility)   L4 resistance at physiostep for 12 minutes with B LEs and UEs L4 resistance at physiostep for 8 minutes with B LEs and UEs L1 resistance at physiostep for 8 minutes with B LEs and UEs L4 resistance at physiostep for 8 minutes with B LEs and UEs L4 resistance at physiostep for 8 minutes with B LEs and UEs L4 resistance at bigger Nustep for 8 minutes with B LEs and UEs   Ankle plantarflexor stretch on incline board 30 second hold x 3 reps with knees extended then flexed 30 second hold x 3 reps with knees extended then flexed 30 second hold x 3 reps with knees extended then flexed 30 second hold x 3 reps with knees extended then flexed 30 second hold x 3 reps with knees extended then flexed 30 second hold x 3 reps with knees extended then flexed   BAPS board*     20 reps/1 set on smaller board into DF/PF, Inv/Ev, and CW/CCW circles    Resisted ankle plantarflexion/dorsiflexion*         Resisted ankle inversion/eversion*                  Standing heel raises with cues to reduce UE use and lower heels all the way to floor    On foam ground; 20 reps/1 set     Standing toe raises    On foam ground; 20 reps/1 set     Step ups with cues to reduce UE use   Onto 8 inch step with foam on top; 30 reps/1 set leading with R LE Onto 8 inch step; 30 reps/1 set leading with each LE  Onto 6 inch step; 20 reps/1 set leading with R LE   Step downs with cues to

## 2024-12-19 ENCOUNTER — HOSPITAL ENCOUNTER (OUTPATIENT)
Dept: PHYSICAL THERAPY | Age: 49
Setting detail: RECURRING SERIES
Discharge: HOME | End: 2024-12-22
Payer: COMMERCIAL

## 2024-12-19 PROCEDURE — 97110 THERAPEUTIC EXERCISES: CPT

## 2024-12-19 PROCEDURE — 97112 NEUROMUSCULAR REEDUCATION: CPT

## 2024-12-19 PROCEDURE — 97140 MANUAL THERAPY 1/> REGIONS: CPT

## 2024-12-19 ASSESSMENT — PAIN SCALES - GENERAL: PAINLEVEL_OUTOF10: 0

## 2024-12-19 NOTE — PROGRESS NOTES
Jeremías Chauncey  : 1975  Primary: [facility] [employer] ([employer])  Secondary:  [facility] @ [place] [address]  [place] [address]  Phone: [phone]  Fax: [phone] Plan Frequency: twice a week for 8 weeks    Plan of Care/Certification Expiration Date: 25      Plan of Care/Certification Expiration Date:  Plan of Care/Certification Expiration Date: 25    Frequency/Duration:   Plan Frequency: twice a week for 8 weeks      Time In/Out:   Time In: 1145  Time Out: 1225    PT Visit Info:    Progress Note Counter: 1        Visit Count:  10     OUTPATIENT PHYSICAL THERAPY:OP NOTE TYPE: Treatment Note 2024       Charge Capture   Episode              Treatment Diagnosis:    Pain in right ankle and joints of right foot  Difficulty in walking, not elsewhere classified  Stiffness of right ankle, not elsewhere classified  Medical/Referring Diagnosis:  Injury of right ankle, initial encounter    Referring Physician:  Alea Jain PA MD Orders:  PT Eval and Treat   Return MD Appt:  unspecified  Date of Onset:  Onset Date: 24     Allergies:   Patient has no known allergies.  Restrictions/Precautions:    Pt wearing brace for next 6 weeks (until )  Interventions Planned See assessment note.        Subjective Assessment: Pt reporting that his ankle still hurts in certain positions/with certain pressure points but otherwise is not very noticeable.    Progress Note Counter: 1 Progress Note Counter: 1      Initial Pain Level:}    0/10  Post Session Pain Level:       0/10  Medications Last Reviewed:  2024  Updated Objective Findings:  None Today  Treatment   Therapeutic Exercise: ( 30 minutes):  Exercises per grid below to improve mobility, strength, balance, and dynamic movement of right ankle/foot to improve functional mobility .  Required visual, verbal, and tactile cues to promote proper body alignment, promote proper body posture, promote

## 2024-12-23 ENCOUNTER — HOSPITAL ENCOUNTER (OUTPATIENT)
Dept: PHYSICAL THERAPY | Age: 49
Setting detail: RECURRING SERIES
Discharge: HOME | End: 2024-12-26
Payer: COMMERCIAL

## 2024-12-23 PROCEDURE — 97110 THERAPEUTIC EXERCISES: CPT

## 2024-12-23 NOTE — PROGRESS NOTES
Jeremías Chauncey  : 1975  Primary: [facility] [facility] [employer] ([employer])  Secondary:  [facility] @ [place] [address]  [place] [address]  Phone: [phone]  Fax: [phone] Plan Frequency: twice a week for 8 weeks    Plan of Care/Certification Expiration Date: 25      Plan of Care/Certification Expiration Date:  Plan of Care/Certification Expiration Date: 25    Frequency/Duration:   Plan Frequency: twice a week for 8 weeks      Time In/Out:   Time In: 1146  Time Out: 1225    PT Visit Info:    Progress Note Counter: 2        Visit Count:  11     OUTPATIENT PHYSICAL THERAPY:OP NOTE TYPE: Treatment Note 2024       Charge Capture   Episode              Treatment Diagnosis:    Pain in right ankle and joints of right foot  Difficulty in walking, not elsewhere classified  Stiffness of right ankle, not elsewhere classified  Medical/Referring Diagnosis:  Injury of right ankle, initial encounter    Referring Physician:  Alea Jain PA MD Orders:  PT Eval and Treat   Return MD Appt:  unspecified  Date of Onset:  Onset Date: 24     Allergies:   Patient has no known allergies.  Restrictions/Precautions:    Pt wearing brace for next 6 weeks (until )  Interventions Planned See assessment note.        Subjective Assessment: Pt is wearing flip flops today (forgot to put on tennis shoes). He is seeing MD on .    Progress Note Counter: 2 Progress Note Counter: 2      Initial Pain Level:}     (no pain reported)/10  Post Session Pain Level:        (no pain reported)/10  Medications Last Reviewed:  2024  Updated Objective Findings:  None Today  Treatment   Therapeutic Exercise: ( 38 minutes):  Exercises per grid below to improve mobility, strength, balance, and dynamic movement of right ankle/foot to improve functional mobility .  Required visual, verbal, and tactile cues to promote proper body alignment, promote proper body posture, promote proper

## 2024-12-26 ENCOUNTER — HOSPITAL ENCOUNTER (OUTPATIENT)
Dept: PHYSICAL THERAPY | Age: 49
Setting detail: RECURRING SERIES
Discharge: HOME | End: 2024-12-29
Payer: COMMERCIAL

## 2024-12-26 PROCEDURE — 97110 THERAPEUTIC EXERCISES: CPT

## 2024-12-26 NOTE — PROGRESS NOTES
Veronica Chauncey  : 1975  Primary: [facility] [employer] ([employer])  Secondary:  [facility] @ [place] [address]  [place] [address]  Phone: [phone]  Fax: [phone] Plan Frequency: twice a week for 8 weeks    Plan of Care/Certification Expiration Date: 25      Plan of Care/Certification Expiration Date:  Plan of Care/Certification Expiration Date: 25    Frequency/Duration:   Plan Frequency: twice a week for 8 weeks      Time In/Out:   Time In: 1147  Time Out: 1226    PT Visit Info:    Progress Note Counter: 3        Visit Count:  12     OUTPATIENT PHYSICAL THERAPY:OP NOTE TYPE: Treatment Note 2024       Charge Capture   Episode              Treatment Diagnosis:    Pain in right ankle and joints of right foot  Difficulty in walking, not elsewhere classified  Stiffness of right ankle, not elsewhere classified  Medical/Referring Diagnosis:  Injury of right ankle, initial encounter    Referring Physician:  Alea Jain PA MD Orders:  PT Eval and Treat   Return MD Appt:  unspecified  Date of Onset:  Onset Date: 24     Allergies:   Patient has no known allergies.  Restrictions/Precautions:    Pt wearing brace for next 6 weeks (until )  Interventions Planned See assessment note.        Subjective Assessment: Pt wearing closed toe shoes today.    Progress Note Counter: 3 Progress Note Counter: 3      Initial Pain Level:}     (no pain reported)/10  Post Session Pain Level:       1 (pt stating minimal to no pain)/10  Medications Last Reviewed:  2024  Updated Objective Findings:  None Today  Treatment   Therapeutic Exercise: ( 38 minutes):  Exercises per grid below to improve mobility, strength, balance, and dynamic movement of right ankle/foot to improve functional mobility .  Required visual, verbal, and tactile cues to promote proper body alignment, promote proper body posture, promote proper body mechanics, and promote proper body

## 2025-01-07 ENCOUNTER — OFFICE VISIT (OUTPATIENT)
Dept: ORTHOPEDIC SURGERY | Age: 50
End: 2025-01-07
Payer: COMMERCIAL

## 2025-01-07 DIAGNOSIS — S99.911D INJURY OF RIGHT ANKLE, SUBSEQUENT ENCOUNTER: Primary | ICD-10-CM

## 2025-01-07 PROCEDURE — 99213 OFFICE O/P EST LOW 20 MIN: CPT | Performed by: ORTHOPAEDIC SURGERY
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
